# Patient Record
Sex: FEMALE | Race: BLACK OR AFRICAN AMERICAN
[De-identification: names, ages, dates, MRNs, and addresses within clinical notes are randomized per-mention and may not be internally consistent; named-entity substitution may affect disease eponyms.]

---

## 2017-01-01 ENCOUNTER — HOSPITAL ENCOUNTER (EMERGENCY)
Dept: HOSPITAL 74 - JER | Age: 37
Discharge: HOME | End: 2017-01-01
Payer: COMMERCIAL

## 2017-01-01 VITALS — BODY MASS INDEX: 21.6 KG/M2

## 2017-01-01 VITALS — DIASTOLIC BLOOD PRESSURE: 68 MMHG | SYSTOLIC BLOOD PRESSURE: 136 MMHG

## 2017-01-01 VITALS — TEMPERATURE: 98 F

## 2017-01-01 VITALS — HEART RATE: 70 BPM

## 2017-01-01 DIAGNOSIS — R07.89: ICD-10-CM

## 2017-01-01 DIAGNOSIS — K21.9: Primary | ICD-10-CM

## 2017-01-01 PROCEDURE — 3E0333Z INTRODUCTION OF ANTI-INFLAMMATORY INTO PERIPHERAL VEIN, PERCUTANEOUS APPROACH: ICD-10-PCS

## 2017-01-01 NOTE — PDOC
History of Present Illness





- General


Chief Complaint: Chest Pain


Stated Complaint: CHEST PAIN


Time Seen by Provider: 01/01/17 08:34


History Source: Patient


Exam Limitations: No Limitations





- History of Present Illness


Initial Comments: 


CHIEF COMPLAINT:  35 y/o afebrile female with no significant PMH c/o chest pain 

this morning. 





HISTORY OF PRESENT ILLNESS:  The patient states while going to the bathroom 

this morning she began feeling sharp pain in her chest and points to her 

epigastric region.  She states the pain travels up her sternum.  She denies 

dizziness, HA, f/c, n/v/d, SOB, cough, abd pain, back pain, hematuria, dysuria. 





Vital signs on arrival are within normal limits. 





REVIEW OF SYSTEMS:


GENERAL/CONSTITUTIONAL: No fever/chills. No weakness. No weight change.


HEAD, EYES, EARS, NOSE AND THROAT: No change in vision. No ear pain or 

discharge. No sore throat.


CARDIOVASCULAR: +chest pain.  No shortness of breath.


RESPIRATORY: No cough, wheezing, or hemoptysis.


GASTROINTESTINAL: No abd pain, nausea, vomiting, diarrhea. 


GENITOURINARY: No dysuria, frequency, or change in urination.


MUSCULOSKELETAL: No joint or muscle swelling or pain. No neck or back pain.


SKIN: No rash or easy bruising.


NEUROLOGIC: No headache, vertigo, loss of consciousness, or loss of sensation.


PSYCHIATRIC: No depression or anxiety.


ENDOCRINE: No increased thirst. No abnormal weight change.


HEMATOLOGIC/LYMPHATIC: No anemia, easy bleeding, or history of blood clots.


ALLERGIC/IMMUNOLOGIC: No hives or skin allergy. No latex allergy.





PHYSICAL EXAM:


GENERAL: The patient is awake, alert, and fully oriented, in no acute distress.

  She is sleeping in the ER bed. 


HEAD: Normal with no signs of trauma.


ENT: Pupils equal, round and reactive to light, extraocular movements intact, 

sclera anicteric, conjunctiva clear. Neck supple.


LUNGS: Clear to auscultation bilaterally. Normal excursion. No respiratory 

distress or use of accessory muscles.


CHEST: Reproducible pain with palpation of anterior sternum and epigastric 

region. 


CV: RRR, S1/S2, no MRG. Cap refill < 2 sec.


ABDOMEN: Soft, non-distended, non-tender even to deep palpation, no 

hepatomegaly or splenomegaly, no masses.


EXTREMITIES: Normal range of motion, no edema.


NEUROLOGICAL: Normal speech, normal gait. CN II-XII grossly intact.


PSYCH: Normal mood, normal affect.


SKIN: Warm, dry, normal turgor, no rashes or lesions noted.











Past History





- Past Medical History


Allergies/Adverse Reactions: 


 Allergies











Allergy/AdvReac Type Severity Reaction Status Date / Time


 


No Known Allergies Allergy   Verified 01/01/17 08:10











Home Medications: 


Ambulatory Orders





Prenatal Vit Calc,Iron,Folic [Prenatal Vitamins] 1 each PO DAILY 01/01/17 








Anemia: No


Asthma: No


Cancer: No


Cardiac Disorders: No


CVA: No


COPD: No


CHF: No


Dementia: No


Diabetes: No


GI Disorders: No


 Disorders: No


HTN: No


Hypercholesterolemia: No


Kidney Stones: No


Liver Disease: No


Suicide Attempt (Hx): No


Seizures: No


Thyroid Disease: No


Other medical history: DENIES.





- Reproductive History


PID: No





- Psycho/Social/Smoking Cessation Hx


Anxiety: No


Suicidal Ideation: No


Smoking History: Current every day smoker


Have you smoked in the past 12 months: Yes


Number of Cigarettes Smoked Daily: 20


Cigars Per Day: 0


Information on smoking cessation initiated: No


'Breaking Loose' booklet given: 11/09/16


Hx Alcohol Use: No


Drug/Substance Use Hx: No


Substance Use Type: None


Hx Substance Use Treatment: Yes ([facility])





Cardiac Specific PMH





- Complaint Specific PMHX


Pacemaker: No





*Physical Exam





- Vital Signs


 Last Vital Signs











Temp Pulse Resp BP Pulse Ox


 


 98 F   70   18   145/77   99 


 


 01/01/17 08:07  01/01/17 14:32  01/01/17 14:32  01/01/17 14:32  01/01/17 14:32














**Heart Score/ECG Review





- ECG Intrepretation


Comment:: 


Twelve-lead EKG was performed and reviewed by Dr. Pride. There is normal 

sinus rhythm with a normal rate. The axis is normal. The intervals are normal. 

There are no ST or T wave abnormalities.





Impression: Normal twelve-lead EKG








ED Treatment Course





- ADDITIONAL ORDERS


Additional order review: 


 Laboratory  Results











  01/01/17





  14:36


 


Urine HCG, Qual  Negative














- Medications


Given in the ED: 


ED Medications














Discontinued Medications














Generic Name Dose Route Start Last Admin





  Trade Name Freq  PRN Reason Stop Dose Admin


 


Ketorolac Tromethamine  60 mg 01/01/17 15:03 01/01/17 15:28





  Toradol Injection -  IVPUSH 01/01/17 15:04  Not Given





  ONCE ONE   


 


Ketorolac Tromethamine  30 mg 01/01/17 15:09 01/01/17 15:15





  Toradol Injection -  IVPUSH 01/01/17 15:10  30 mg





  ONCE ONE   Administration


 


Ranitidine HCl  300 mg 01/01/17 10:47 01/01/17 11:27





  Zantac -  PO 01/01/17 10:48  300 mg





  ONCE ONE   Administration














Medical Decision Making





- Medical Decision Making


A/P: 35 y/o female with chest pain that appears to be GI related.  Plan is as 

follows:





1. EKG


2. hcg


3. GI cocktail





EKG normal


Hcg - negative


IV toradol ordered





The patient states she feels much better and all of her pain has gone.  Suspect 

musculoskeletal chest pain and GERD.  Will discharge to home with supportive 

care instructions. 





Suggested she return to the ER with any worsening or concerning symptoms. 





The patient verbalizes understanding of all instructions, has no further 

questions and is awaiting discharge.








*DC/Admit/Observation/Transfer


Diagnosis at time of Disposition: 


 Musculoskeletal chest pain, Gastroesophageal reflux disease





- Discharge Dispostion


Disposition: HOME


Condition at time of disposition: Improved





- Patient Instructions


Printed Discharge Instructions:  DI for Musculoskeletal Pain, DI for Atypical 

Chest Pain, GERD Diet, DI for Gastroesophageal Reflux Disease (GERD)


Additional Instructions: 


Discharge Instructions:


-Take tylenol for your chest pain


-Take zantac if needed for upper abd pain 


-Follow up with your doctor within 1 week


-Return to the ER immediately with any worsening or concerning symptoms

## 2017-01-02 NOTE — EKG
Test Reason : 

Blood Pressure : ***/*** mmHG

Vent. Rate : 071 BPM     Atrial Rate : 071 BPM

   P-R Int : 148 ms          QRS Dur : 102 ms

    QT Int : 416 ms       P-R-T Axes : 050 044 038 degrees

   QTc Int : 452 ms

 

NORMAL SINUS RHYTHM WITH SINUS ARRHYTHMIA

POSSIBLE LEFT ATRIAL ENLARGEMENT

LEFT VENTRICULAR HYPERTROPHY

ABNORMAL ECG

NO PREVIOUS ECGS AVAILABLE

Confirmed by AGUSTO KILGORE MD ([id number]) on 1/2/2017 1:07:18 PM

 

Referred By:             Overread By: AGUSTO KILGORE MD

## 2017-09-28 ENCOUNTER — HOSPITAL ENCOUNTER (EMERGENCY)
Dept: HOSPITAL 74 - JER | Age: 37
Discharge: HOME | End: 2017-09-28
Payer: COMMERCIAL

## 2017-09-28 VITALS — SYSTOLIC BLOOD PRESSURE: 121 MMHG | HEART RATE: 72 BPM | DIASTOLIC BLOOD PRESSURE: 68 MMHG

## 2017-09-28 VITALS — TEMPERATURE: 98.6 F

## 2017-09-28 VITALS — BODY MASS INDEX: 21.6 KG/M2
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
## 2017-09-28 DIAGNOSIS — L02.415: Primary | ICD-10-CM
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
## 2017-09-28 PROCEDURE — 0H9KXZZ DRAINAGE OF RIGHT LOWER LEG SKIN, EXTERNAL APPROACH: ICD-10-PCS | Performed by: EMERGENCY MEDICINE

## 2017-09-28 NOTE — PDOC
History of Present Illness





- General


Chief Complaint: Wound Infection


Stated Complaint: SPIDER BITE


Time Seen by Provider: 09/28/17 15:02





Past History





- Past Medical History


Allergies/Adverse Reactions: 


 Allergies











Allergy/AdvReac Type Severity Reaction Status Date / Time


 


No Known Allergies Allergy   Verified 09/28/17 14:42











Home Medications: 


Ambulatory Orders





Prenatal Vit Calc,Iron,Folic [Prenatal Vitamins] 1 each PO DAILY 01/01/17 


Ondansetron [Zofran *Odt*] 8 mg SL TID PRN #21 od.tablet 05/12/17 








Anemia: No


Asthma: No


Cancer: No


Cardiac Disorders: No


CVA: No


COPD: No


CHF: No


Dementia: No


Diabetes: No


GI Disorders: No


 Disorders: No


HTN: No


Hypercholesterolemia: No


Kidney Stones: No


Liver Disease: No


Seizures: No


Thyroid Disease: No





- Reproductive History


PID: No





- Suicide/Smoking/Psychosocial Hx


Smoking History: Current every day smoker


Have you smoked in the past 12 months: Yes


Number of Cigarettes Smoked Daily: 20


Cigars Per Day: 0


Information on smoking cessation initiated: No


'Breaking Loose' booklet given: 11/09/16


Hx Alcohol Use: No


Drug/Substance Use Hx: No


Substance Use Type: None


Hx Substance Use Treatment: Yes ([facility])





*Physical Exam





- Vital Signs


 Last Vital Signs











Temp Pulse Resp BP Pulse Ox


 


 98.6 F   89   20   132/78   99 


 


 09/28/17 14:43  09/28/17 14:43  09/28/17 14:43  09/28/17 14:43  09/28/17 14:43

## 2017-09-28 NOTE — PDOC
History of Present Illness





- General


Chief Complaint: Wound Infection


Stated Complaint: SPIDER BITE


Time Seen by Provider: 17 15:02





- History of Present Illness


Initial Comments: 


17 15:02


Ms. Rodriguez is a 37 yo female with no significant past medical history who 

presents to the emergency department complaining of a "spider bite" in her 

upper right thigh. She says that she first noticed a small bite in this 

location on Tuesday that progressed to about 3 inches in diameter. Yesterday 

she attempted to express fluid and was able to get minimal white pus out of it. 

She complains of subjective warmth / fevers.





The patient denies chest pain, shortness of breath, headache and dizziness. 

Denies chills, nausea, vomit, diarrhea and constipation. Denies dysuria, 

frequency, urgency and hematuria.





Allergies: NKDA


Past surgical history:Denies








Past History





- Past Medical History


Allergies/Adverse Reactions: 


 Allergies











Allergy/AdvReac Type Severity Reaction Status Date / Time


 


No Known Allergies Allergy   Verified 17 14:42











Home Medications: 


Ambulatory Orders





Sulfamethoxazole/Trimethoprim [Bactrim Ds -] 1 tab PO BID #20 tablet 17 








Anemia: No


Asthma: No


Cancer: No


Cardiac Disorders: No


CVA: No


COPD: No


CHF: No


Dementia: No


Diabetes: No


GI Disorders: No


 Disorders: No


HTN: No


Hypercholesterolemia: No


Kidney Stones: No


Liver Disease: No


Seizures: No


Thyroid Disease: No





- Reproductive History


PID: No





- Suicide/Smoking/Psychosocial Hx


Smoking History: Current every day smoker


Have you smoked in the past 12 months: Yes


Number of Cigarettes Smoked Daily: 20


Cigars Per Day: 0


Information on smoking cessation initiated: No


'Breaking Loose' booklet given: 16


Hx Alcohol Use: No


Drug/Substance Use Hx: No


Substance Use Type: None


Hx Substance Use Treatment: Yes ([facility])





**Review of Systems





- Review of Systems


Comments:: 


17 15:02


GENERAL/CONSTITUTIONAL: No fever or chills. No weakness.


HEAD, EYES, EARS, NOSE AND THROAT: No change in vision. No ear pain or 

discharge. No sore throat.


CARDIOVASCULAR: No chest pain or shortness of breath


RESPIRATORY: No cough, wheezing, or hemoptysis.


GASTROINTESTINAL: No nausea, vomiting, diarrhea or constipation.


GENITOURINARY: No dysuria, frequency, or change in urination.


MUSCULOSKELETAL: +Painful Right leg infection. No joint or muscle swelling. No 

neck or back pain.


SKIN: No rash


NEUROLOGIC: No headache, vertigo, loss of consciousness, or change in strength/

sensation.


ENDOCRINE: No increased thirst. No abnormal weight change


HEMATOLOGIC/LYMPHATIC: No anemia, easy bleeding, or history of blood clots.


ALLERGIC/IMMUNOLOGIC: No hives or skin allergy.





*Physical Exam





- Vital Signs


 Last Vital Signs











Temp Pulse Resp BP Pulse Ox


 


 98.6 F   89   20   132/78   99 


 


 17 14:43  17 14:43  17 14:43  17 14:43  17 14:43














- Physical Exam


Comments: 


17 15:02


GENERAL: Awake, alert, and fully oriented, in no acute distress


HEAD: No signs of trauma, normocephalic, atraumatic


EYES: PERRLA, EOMI, sclera anicteric, conjunctiva clear


ENT: Auricles normal inspection, hearing grossly normal, nares patent, 

oropharynx clear without


exudates. Moist mucosa


NECK: Normal ROM, supple, no lymphadenopathy, JVD, or masses


LUNGS: No distress, speaks full sentences, clear to auscultation bilaterally


HEART: Regular rate and rhythm, normal S1 and S2, no murmurs, rubs or gallops, 

peripheral pulses normal and equal bilaterally.


ABDOMEN: Soft, nontender, normoactive bowel sounds.  No guarding, no rebound.  

No masses


EXTREMITIES: +Right upper thigh fluctuant abscess measuring approximately 3 

inches in diameter on the lateral portion of the RLE. Warm to touch. Normal 

inspection, Normal range of motion, no edema.  No clubbing or cyanosis.


NEUROLOGICAL: Cranial nerves II through XII grossly intact.  Normal speech, 

normal gait, no focal sensorimotor deficits


SKIN: Warm, Dry, normal turgor, no rashes or lesions noted.








Procedures





- Incision and Drainage


I&D Site: Right: Leg


Anesthesia: 1% Lidocaine


Volume(ml): 4


Iodinated Packin/ in





Medical Decision Making





- Medical Decision Making





17 15:47


Ms. Rodriguez presents for evaluation of her leg abscess. 





*DC/Admit/Observation/Transfer


Diagnosis at time of Disposition: 


 wound





- Discharge Dispostion


Disposition: HOME





- Prescriptions


Prescriptions: 


Sulfamethoxazole/Trimethoprim [Bactrim Ds -] 1 tab PO BID #20 tablet





- Referrals


Referrals: 


STAFF,NOT ON [Primary Care Provider] -

## 2017-09-30 NOTE — PDOC
Attending Attestation





- Resident


Resident Name: Santosh James





- HPI


HPI: 





09/30/17 14:05


Pt presents to the ED complaining of abscess to the R flank.  Denies systemic 

complaints. 





- Physicial Exam


PE: 





09/30/17 14:06


Agree with above exam.  3 cm abscess to left flank





- Medical Decision Making





09/30/17 14:07


pt presented to the ED with abscess without signs of systemic infection.  I and 

D performed in the Ed.  Will discharge home.

## 2019-07-01 ENCOUNTER — HOSPITAL ENCOUNTER (INPATIENT)
Dept: HOSPITAL 74 - YASAS | Age: 39
LOS: 5 days | Discharge: HOME | DRG: 773 | End: 2019-07-06
Attending: SURGERY | Admitting: SURGERY
Payer: COMMERCIAL

## 2019-07-01 VITALS — BODY MASS INDEX: 22.9 KG/M2

## 2019-07-01 DIAGNOSIS — Z85.43: ICD-10-CM

## 2019-07-01 DIAGNOSIS — E87.6: ICD-10-CM

## 2019-07-01 DIAGNOSIS — F14.10: ICD-10-CM

## 2019-07-01 DIAGNOSIS — K21.9: ICD-10-CM

## 2019-07-01 DIAGNOSIS — F17.210: ICD-10-CM

## 2019-07-01 DIAGNOSIS — D64.9: ICD-10-CM

## 2019-07-01 DIAGNOSIS — F11.23: Primary | ICD-10-CM

## 2019-07-01 DIAGNOSIS — F13.10: ICD-10-CM

## 2019-07-01 DIAGNOSIS — F12.20: ICD-10-CM

## 2019-07-01 DIAGNOSIS — L30.9: ICD-10-CM

## 2019-07-01 DIAGNOSIS — F32.9: ICD-10-CM

## 2019-07-01 LAB
ALBUMIN SERPL-MCNC: 3.9 G/DL (ref 3.4–5)
ALP SERPL-CCNC: 67 U/L (ref 45–117)
ALT SERPL-CCNC: 18 U/L (ref 13–61)
ANION GAP SERPL CALC-SCNC: 4 MMOL/L (ref 8–16)
AST SERPL-CCNC: 8 U/L (ref 15–37)
BILIRUB SERPL-MCNC: 0.5 MG/DL (ref 0.2–1)
BUN SERPL-MCNC: 10.2 MG/DL (ref 7–18)
CALCIUM SERPL-MCNC: 8.8 MG/DL (ref 8.5–10.1)
CHLORIDE SERPL-SCNC: 105 MMOL/L (ref 98–107)
CO2 SERPL-SCNC: 28 MMOL/L (ref 21–32)
CREAT SERPL-MCNC: 0.8 MG/DL (ref 0.55–1.3)
DEPRECATED RDW RBC AUTO: 17 % (ref 11.6–15.6)
GLUCOSE SERPL-MCNC: 109 MG/DL (ref 74–106)
HCT VFR BLD CALC: 30.7 % (ref 32.4–45.2)
HGB BLD-MCNC: 9.9 GM/DL (ref 10.7–15.3)
MCH RBC QN AUTO: 25.3 PG (ref 25.7–33.7)
MCHC RBC AUTO-ENTMCNC: 32.4 G/DL (ref 32–36)
MCV RBC: 78.3 FL (ref 80–96)
PLATELET # BLD AUTO: 219 K/MM3 (ref 134–434)
PMV BLD: 7.3 FL (ref 7.5–11.1)
POTASSIUM SERPLBLD-SCNC: 3.4 MMOL/L (ref 3.5–5.1)
PROT SERPL-MCNC: 7.5 G/DL (ref 6.4–8.2)
RBC # BLD AUTO: 3.92 M/MM3 (ref 3.6–5.2)
SODIUM SERPL-SCNC: 137 MMOL/L (ref 136–145)
WBC # BLD AUTO: 3.8 K/MM3 (ref 4–10)

## 2019-07-01 PROCEDURE — HZ2ZZZZ DETOXIFICATION SERVICES FOR SUBSTANCE ABUSE TREATMENT: ICD-10-PCS | Performed by: SURGERY

## 2019-07-01 RX ADMIN — Medication PRN MG: at 22:28

## 2019-07-01 RX ADMIN — HYDROXYZINE PAMOATE PRN MG: 25 CAPSULE ORAL at 22:28

## 2019-07-01 RX ADMIN — METHOCARBAMOL PRN MG: 500 TABLET ORAL at 22:28

## 2019-07-01 RX ADMIN — Medication SCH MG: at 22:28

## 2019-07-01 NOTE — HP
COWS





- Scale


Resting Pulse: 0= HI 80 or Below


Sweatin= No chills or Flushing


Restless Observation: 1= Difficult to Sit Still


Pupil Size: 0= Normal to Room Light


Bone or Joint Aches: 1= Mild Discomfort


Runny Nose/ Eye Tearin= None


GI Upset > 30mins: 1= Stomach Cramp


Tremor Observation: 2= Slight Tremor Visible


Yawning Observation: 1= 1-2x During Session


Anxiety or Irritability: 1=Feels Anxious/Irritable


Goose Flesh Skin: 3=Piloerection


COWS Score: 10





CIWA Score





- Admission Criteria


[facility] Guidelines: Admission for Medically Managed Detox: 


Requires at least one of the followin. CIWA greater than 12


2. Seizures within the past 24 hours


3. Delirium tremens within the past 24 hours


4. Hallucinations within the past 24 hours


5. Acute intervention needed for co  occurring medical disorder


6. Acute intervention needed for co  occurring psychiatric disorder


7. Severe withdrawal that cannot be handled at a lower level of care (continued


    vomiting, continued diarrhea, abnormal vital signs) requiring intravenous


    medication and/or fluids


8. Pregnancy








Admission ROS [facility]


Chief Complaint: 





39 y/o with hx ovarian CA, eczema, who presents for detox from heroin. Last use 

of heroin was this AM. Uses 20 bags/time. Spends 200-300 dollars at a time for 

it. Used intranasally. Doesn't use intravenously. Longest sobriety five years. 

Has never overdosed; no suicidal or homicide attempts previously. Uses two bags 

of marijuana a day.





PMH: ovarin CA, eczema


PsxH: none


meds: none


allergies: NKDA


FH: none


SH: lives in a private house in [place].


smokes 1 ppd cigarettes x 10 yrs.


denies alcohol use.


uses heroin as above





Allergies/Adverse Reactions: 


 Allergies











Allergy/AdvReac Type Severity Reaction Status Date / Time


 


No Known Allergies Allergy   Verified 19 12:59











History of Present Illness: 


39 y/o F with hx ovarian CA ( 1 round of chemotherapy in past; d/c due to side 

effect) and eczema who presents for heroin detox. Was in detox last in 2017 and 

rehab in 2017.


Exam Limitations: No Limitations





- Ebola screening


Have you traveled outside of the country in the last 21 days: No


Have you had contact with anyone from an Ebola affected area: No


Do you have a fever: No





- Review of Systems


Constitutional: Loss of Appetite


EENT: reports: No Symptoms Reported


Respiratory: reports: No Symptoms reported


Cardiac: reports: No Symptoms Reported


GI: reports: No Symptoms Reported


: reports: No Symptoms Reported


Musculoskeletal: reports: No Symptoms Reported


Integumentary: reports: Dryness


Neuro: reports: No Symptoms reported


Endocrine: reports: No Symptoms Reported


Hematology: reports: No Symptoms Reported


Psychiatric: reports: Agitated, Anxious





Patient History





- Patient Medical History


Hx Anemia: No


Hx Asthma: No


Hx Chronic Obstructive Pulmonary Disease (COPD): No


Hx Cancer: No


Hx Cardiac Disorders: No


Hx Congestive Heart Failure: No


Hx Hypertension: No


Hx Hypercholesterolemia: No


Hx Pacemaker: No


HX Cerebrovascular Accident: No


Hx Seizures: No


Hx Dementia: No


Hx Diabetes: No


Hx Gastrointestinal Disorders: No


Hx Liver Disease: No


Hx Genitourinary Disorders: No


Hx Sexually Transmitted Disorders: No


Hx Renal Disease (ESRD): No


Hx Thyroid Disease: No


Hx Human Immunodeficiency Virus (HIV): No


Hx Hepatitis C: No


Hx Depression: Yes (DENIES/ SI/HI)


Hx Suicide Attempt: No


Hx Bipolar Disorder: No


Hx Schizophrenia: No


Other Medical History: ovarian cancer, eczema





- Patient Surgical History


Past Surgical History: No





- PPD History


Documented Results: Negative w/o proof


Date: 16


Results: 0mm


PPD to be Administered?: No





- Reproductive History


Patient is a Female of Child Bearing Age (11 -55 yrs old): Yes


Last Menstrual Period: 20


Patient Pregnant: No





- Smoking Cessation


Smoking history: Current every day smoker


Have you smoked in the past 12 months: Yes


Aproximately how many cigarettes per day: 20


Cigars Per Day: 0


Hx Chewing Tobacco Use: No


Initiated information on smoking cessation: Yes


'Breaking Loose' booklet given: 19





- Substance & Tx. History


Hx Alcohol Use: No


Hx Substance Use: Yes


Substance Use Type: Heroin, Marijuana


Hx Substance Use Treatment: Yes (at [facility] for detox and rehab 2017 )





- Substances abused


  ** Heroin


Substance route: Inhalation


Frequency: Daily


Amount used: 40 bags


Age of first use: 20


Date of last use: 19





  ** Marijuana/Hashish


Substance route: Smoking


Frequency: Daily


Amount used: 2 bags


Age of first use: 20


Date of last use: 20





Family Disease History





- Family Disease History


Family History: Denies


Family Disease History: Other: Father ( AIDS), Mother (HIV)





Admission Physical Exam [facility]





- Vital Signs


Vital Signs: 


 Vital Signs - 24 hr











  19





  13:00


 


Temperature 97 F L


 


Pulse Rate 79


 


Respiratory 20





Rate 


 


Blood Pressure 127/85














- Physical


General Appearance: Yes: Within Normal Limits, Thin, Irritable, Anxious


HEENTM: Yes: Normocephalic


Respiratory: Yes: Within Normal Limits, Other (+poor inspiratory effort)


Neck: Yes: Supple


Breast: Yes: Breast Exam Deferred


Cardiology: Yes: Tachycardia


Abdominal: Yes: Within Normal Limits


Genitourinary: Yes: Within Normal Limits


Back: Yes: Muscle Spasm


Musculoskeletal: Yes: full range of Motion


Extremities: Yes: Other (+L anterior portion of ankle with minor swelling, TTP)


Neurological: Yes: Within Normal Limits


Integumentary: Yes: Dry, Warm





- Diagnostic


(1) Heroin withdrawal


Current Visit: Yes   Status: Acute   





(2) Marijuana dependence


Current Visit: Yes   Status: Acute   





(3) Ovarian cancer


Current Visit: Yes   Status: Acute   





Cleared for Admission [facility]





- Detox or Rehab


[facility] Level of Care: Medically Managed


Detox Regimen/Protocol: Methadone





Breathalyzer





- Breathalyzer


Breathalyzer: 0





Urine Drug Screen





- Test Device


Lot number: [id number]


Expiration date: 21





- Control


Is test valid?: Yes





- Results


Drug screen NEGATIVE: No


Urine drug screen results: THC-Marijuana, DARLYN-Cocaine, FEN-Fentanyl, MOP-Opiates

, BZO-Benzodiazepines





Inpatient Rehab Admission





- Rehab Decision to Admit


Inpatient rehab admission?: No

## 2019-07-02 RX ADMIN — Medication SCH MG: at 22:14

## 2019-07-02 RX ADMIN — HYDROXYZINE PAMOATE PRN MG: 25 CAPSULE ORAL at 22:15

## 2019-07-02 RX ADMIN — Medication SCH TAB: at 11:29

## 2019-07-02 RX ADMIN — METHOCARBAMOL PRN MG: 500 TABLET ORAL at 22:14

## 2019-07-02 RX ADMIN — Medication PRN MG: at 22:15

## 2019-07-02 RX ADMIN — POTASSIUM CHLORIDE SCH MEQ: 1500 TABLET, EXTENDED RELEASE ORAL at 15:14

## 2019-07-02 NOTE — EKG
Test Reason : 

Blood Pressure : ***/*** mmHG

Vent. Rate : 069 BPM     Atrial Rate : 069 BPM

   P-R Int : 182 ms          QRS Dur : 104 ms

    QT Int : 406 ms       P-R-T Axes : 051 045 027 degrees

   QTc Int : 435 ms

 

NORMAL SINUS RHYTHM

POSSIBLE LEFT ATRIAL ENLARGEMENT

LEFT VENTRICULAR HYPERTROPHY

ABNORMAL ECG

WHEN COMPARED WITH ECG OF 01-JAN-2017 08:10,

NO SIGNIFICANT CHANGE WAS FOUND

Confirmed by MD JOSIE, DAVID ([id number]) on 7/2/2019 1:47:17 PM

 

Referred By: LARISA CARLISLE           Confirmed By:DAVID GALINDO MD

## 2019-07-02 NOTE — PN
[facility] Progress Note


Note: 





i personally present,review,plan for treatment,history and physical examination 

and concurred and agreed with 


Dr.Abbi Mukherjee,patient need inpatient detox,medical manage and opiate regimen

## 2019-07-02 NOTE — PN
[facility] COWS





- Scale


Resting Pulse: 0= VA 80 or Below


Sweatin=Flushed/Facial Moisture


Restless Observation: 0= Sits Still


Pupil Size: 0= Normal to Room Light


Bone or Joint Aches: 1= Mild Discomfort


Runny Nose/ Eye Tearin= Nasal Congestion


GI Upset > 30mins: 0= None


Tremor Observation of Outstretched Hands: 1= Tremor Felt, Not Seen


Yawning Observation: 1= 1-2x During Session


Anxiety or Irritability: 1=Feels Anxious/Irritable


Goose Flesh Skin: 0=Smooth Skin


COWS Score: 7





[facility] Progress Note (SOAP)


Subjective: 





sweats


shakes


interrupted sleep


body aches


Objective: 





19 14:32


 Vital Signs











Temperature  97.5 F L  19 13:28


 


Pulse Rate  71   19 13:28


 


Respiratory Rate  18   19 13:28


 


Blood Pressure  135/87   19 13:28


 


O2 Sat by Pulse Oximetry (%)      








 Laboratory Tests











  19





  15:30 15:30 15:30


 


WBC  3.8 L  


 


RBC  3.92  


 


Hgb  9.9 L  


 


Hct  30.7 L  


 


MCV  78.3 L  


 


MCH  25.3 L  


 


MCHC  32.4  


 


RDW  17.0 H  


 


Plt Count  219  D  


 


MPV  7.3 L  


 


Sodium   137 


 


Potassium   3.4 L 


 


Chloride   105 


 


Carbon Dioxide   28 


 


Anion Gap   4 L 


 


BUN   10.2 


 


Creatinine   0.8 


 


Est GFR (CKD-EPI)AfAm   109.16 


 


Est GFR (CKD-EPI)NonAf   94.18 


 


Random Glucose   109 H 


 


Calcium   8.8 


 


Total Bilirubin   0.5 


 


AST   8 L 


 


ALT   18 


 


Alkaline Phosphatase   67 


 


Total Protein   7.5 


 


Albumin   3.9 


 


RPR Titer    Nonreactive








labs noted


potassium minimal low 3.4; 20meq x 2 days 


aaox3


lying in bed


no acute distress 


Assessment: 





19 14:33


withdrawal sx


Plan: 





continue detox


increase fluids

## 2019-07-03 LAB
APPEARANCE UR: (no result)
BACTERIA # UR AUTO: 215.3 /HPF
BILIRUB UR STRIP.AUTO-MCNC: NEGATIVE MG/DL
CASTS URNS QL MICRO: 23 /LPF (ref 0–8)
COLOR UR: (no result)
CRYSTALS URNS QL MICRO: (no result) /HPF
EPITH CASTS URNS QL MICRO: 30.5 /HPF
KETONES UR QL STRIP: NEGATIVE
LEUKOCYTE ESTERASE UR QL STRIP.AUTO: (no result)
NITRITE UR QL STRIP: NEGATIVE
PH UR: 5 [PH] (ref 5–8)
PROT UR QL STRIP: NEGATIVE
PROT UR QL STRIP: NEGATIVE
RBC # BLD AUTO: 5 /HPF (ref 0–4)
SP GR UR: 1.03 (ref 1.01–1.03)
UROBILINOGEN UR STRIP-MCNC: 0.2 MG/DL (ref 0.2–1)
WBC # UR AUTO: 16 /HPF (ref 0–5)

## 2019-07-03 RX ADMIN — Medication SCH MG: at 22:17

## 2019-07-03 RX ADMIN — POTASSIUM CHLORIDE SCH MEQ: 1500 TABLET, EXTENDED RELEASE ORAL at 10:34

## 2019-07-03 RX ADMIN — Medication SCH TAB: at 10:34

## 2019-07-03 RX ADMIN — Medication PRN MG: at 22:17

## 2019-07-03 NOTE — PN
[facility] COWS





- Scale


Resting Pulse: 0= [place] 80 or Below


Sweatin= Chills/Flushing


Restless Observation: 0= Sits Still


Pupil Size: 0= Normal to Room Light


Bone or Joint Aches: 1= Mild Discomfort


Runny Nose/ Eye Tearin= Nasal Congestion


GI Upset > 30mins: 0= None


Tremor Observation of Outstretched Hands: 1= Tremor Felt, Not Seen


Yawning Observation: 0= None


Anxiety or Irritability: 2=Irritable/Anxious


Goose Flesh Skin: 0=Smooth Skin (1)


COWS Score: 6





BHS Progress Note (SOAP)


Subjective: 





ANXIETY, SWEATS/CHILLS,INTERMITTENT SLEEP.


Objective: 





19 15:12


 Vital Signs - 24 hr











  19





  17:32 21:34 03:30


 


Temperature 98.4 F 98.2 F 


 


Pulse Rate 76 69 


 


Respiratory 18 18 16





Rate   


 


Blood Pressure 145/76 137/80 














  19





  08:27 09:46 13:25


 


Temperature 99.1 F 97.8 F 96.5 F L


 


Pulse Rate 71 66 80


 


Respiratory 16 18 18





Rate   


 


Blood Pressure 138/79 155/78 146/94








 Laboratory Tests











  19





  15:30 15:30 15:30


 


WBC  3.8 L  


 


RBC  3.92  


 


Hgb  9.9 L  


 


Hct  30.7 L  


 


MCV  78.3 L  


 


MCH  25.3 L  


 


MCHC  32.4  


 


RDW  17.0 H  


 


Plt Count  219  D  


 


MPV  7.3 L  


 


Sodium   137 


 


Potassium   3.4 L 


 


Chloride   105 


 


Carbon Dioxide   28 


 


Anion Gap   4 L 


 


BUN   10.2 


 


Creatinine   0.8 


 


Est GFR (CKD-EPI)AfAm   109.16 


 


Est GFR (CKD-EPI)NonAf   94.18 


 


Random Glucose   109 H 


 


Calcium   8.8 


 


Total Bilirubin   0.5 


 


AST   8 L 


 


ALT   18 


 


Alkaline Phosphatase   67 


 


Total Protein   7.5 


 


Albumin   3.9 


 


Urine Color   


 


Urine Appearance   


 


Urine pH   


 


Ur Specific Gravity   


 


Urine Protein   


 


Urine Glucose (UA)   


 


Urine Ketones   


 


Urine Blood   


 


Urine Nitrite   


 


Urine Bilirubin   


 


Urine Urobilinogen   


 


Ur Leukocyte Esterase   


 


Urine WBC (Auto)   


 


Urine RBC (Auto)   


 


Urine Casts (Auto)   


 


U Pathogenic Cast Auto   


 


U Epithel Cells (Auto)   


 


Urine Crystals (Auto)   


 


Urine Bacteria (Auto)   


 


RPR Titer    Nonreactive














  19





  08:50


 


WBC 


 


RBC 


 


Hgb 


 


Hct 


 


MCV 


 


MCH 


 


MCHC 


 


RDW 


 


Plt Count 


 


MPV 


 


Sodium 


 


Potassium 


 


Chloride 


 


Carbon Dioxide 


 


Anion Gap 


 


BUN 


 


Creatinine 


 


Est GFR (CKD-EPI)AfAm 


 


Est GFR (CKD-EPI)NonAf 


 


Random Glucose 


 


Calcium 


 


Total Bilirubin 


 


AST 


 


ALT 


 


Alkaline Phosphatase 


 


Total Protein 


 


Albumin 


 


Urine Color  Dk yellow


 


Urine Appearance  Turbid


 


Urine pH  5.0  D


 


Ur Specific Gravity  1.031


 


Urine Protein  Negative


 


Urine Glucose (UA)  Negative


 


Urine Ketones  Negative


 


Urine Blood  Negative


 


Urine Nitrite  Negative


 


Urine Bilirubin  Negative


 


Urine Urobilinogen  0.2


 


Ur Leukocyte Esterase  Trace


 


Urine WBC (Auto)  16


 


Urine RBC (Auto)  5


 


Urine Casts (Auto)  23


 


U Pathogenic Cast Auto  None seen


 


U Epithel Cells (Auto)  30.5


 


Urine Crystals (Auto)  Amoprphous urates


 


Urine Bacteria (Auto)  215.3


 


RPR Titer 








K+ =3.4


Assessment: 





19 15:13


WITHDRAWALS SX


BORDERLINE HYPOKALEMIA


Plan: 





CONTINUE DETOX


ON [facility]

## 2019-07-04 RX ADMIN — Medication SCH TAB: at 10:34

## 2019-07-04 RX ADMIN — POTASSIUM CHLORIDE SCH MEQ: 1500 TABLET, EXTENDED RELEASE ORAL at 10:34

## 2019-07-04 RX ADMIN — Medication PRN MG: at 22:37

## 2019-07-04 RX ADMIN — Medication SCH MG: at 22:37

## 2019-07-04 RX ADMIN — METHOCARBAMOL PRN MG: 500 TABLET ORAL at 22:37

## 2019-07-04 NOTE — PN
[facility] COWS





- Scale


Resting Pulse: 0= [place] 80 or Below


Sweatin= Chills/Flushing


Restless Observation: 0= Sits Still


Pupil Size: 0= Normal to Room Light


Bone or Joint Aches: 0= None


Runny Nose/ Eye Tearin= None


GI Upset > 30mins: 0= None


Tremor Observation of Outstretched Hands: 0= None


Yawning Observation: 2= >3x During Session


Anxiety or Irritability: 2=Irritable/Anxious


Goose Flesh Skin: 0=Smooth Skin


COWS Score: 5





BHS Progress Note (SOAP)


Subjective: 





c/o chills and irritability.


Objective: 





19 11:06


 Vital Signs











  19





  03:30 06:00 09:36


 


Temperature  98.2 F 98.1 F


 


Pulse Rate  68 61


 


Respiratory 18 18 16





Rate   


 


Blood Pressure  126/81 134/93











Assessment: 





19 11:06


AOX3, in no acute respiratory distress


Full ROM, ambulating in the unit.


withdrawal symptoms.


Plan: 





continue detox.

## 2019-07-05 RX ADMIN — Medication PRN MG: at 22:32

## 2019-07-05 RX ADMIN — HYDROXYZINE PAMOATE PRN MG: 25 CAPSULE ORAL at 22:32

## 2019-07-05 RX ADMIN — Medication SCH MG: at 22:32

## 2019-07-05 RX ADMIN — Medication SCH TAB: at 10:31

## 2019-07-05 NOTE — PN
[facility] COWS





- Scale


Resting Pulse: 0= [place] 80 or Below


Sweatin= Chills/Flushing


Restless Observation: 0= Sits Still


Pupil Size: 1= Pupils >than Normal


Bone or Joint Aches: 0= None


Runny Nose/ Eye Tearin= None


GI Upset > 30mins: 0= None


Tremor Observation of Outstretched Hands: 1= Tremor Felt, Not Seen


Yawning Observation: 0= None


Anxiety or Irritability: 1=Feels Anxious/Irritable


Goose Flesh Skin: 0=Smooth Skin


COWS Score: 4





BHS Progress Note (SOAP)


Subjective: 





interrupted sleep, 


Objective: 





19 11:42


 Vital Signs











Temperature  97.8 F   19 06:49


 


Pulse Rate  55 L  19 06:49


 


Respiratory Rate  16   19 06:49


 


Blood Pressure  139/68   19 06:49


 


O2 Sat by Pulse Oximetry (%)      








 Laboratory Tests











  19





  15:30 15:30 15:30


 


WBC  3.8 L  


 


RBC  3.92  


 


Hgb  9.9 L  


 


Hct  30.7 L  


 


MCV  78.3 L  


 


MCH  25.3 L  


 


MCHC  32.4  


 


RDW  17.0 H  


 


Plt Count  219  D  


 


MPV  7.3 L  


 


Sodium   137 


 


Potassium   3.4 L 


 


Chloride   105 


 


Carbon Dioxide   28 


 


Anion Gap   4 L 


 


BUN   10.2 


 


Creatinine   0.8 


 


Est GFR (CKD-EPI)AfAm   109.16 


 


Est GFR (CKD-EPI)NonAf   94.18 


 


Random Glucose   109 H 


 


Calcium   8.8 


 


Total Bilirubin   0.5 


 


AST   8 L 


 


ALT   18 


 


Alkaline Phosphatase   67 


 


Total Protein   7.5 


 


Albumin   3.9 


 


Urine Color   


 


Urine Appearance   


 


Urine pH   


 


Ur Specific Gravity   


 


Urine Protein   


 


Urine Glucose (UA)   


 


Urine Ketones   


 


Urine Blood   


 


Urine Nitrite   


 


Urine Bilirubin   


 


Urine Urobilinogen   


 


Ur Leukocyte Esterase   


 


Urine WBC (Auto)   


 


Urine RBC (Auto)   


 


Urine Casts (Auto)   


 


U Pathogenic Cast Auto   


 


U Epithel Cells (Auto)   


 


Urine Crystals (Auto)   


 


Urine Bacteria (Auto)   


 


RPR Titer    Nonreactive














  19





  08:50


 


WBC 


 


RBC 


 


Hgb 


 


Hct 


 


MCV 


 


MCH 


 


MCHC 


 


RDW 


 


Plt Count 


 


MPV 


 


Sodium 


 


Potassium 


 


Chloride 


 


Carbon Dioxide 


 


Anion Gap 


 


BUN 


 


Creatinine 


 


Est GFR (CKD-EPI)AfAm 


 


Est GFR (CKD-EPI)NonAf 


 


Random Glucose 


 


Calcium 


 


Total Bilirubin 


 


AST 


 


ALT 


 


Alkaline Phosphatase 


 


Total Protein 


 


Albumin 


 


Urine Color  Dk yellow


 


Urine Appearance  Turbid


 


Urine pH  5.0  D


 


Ur Specific Gravity  1.031


 


Urine Protein  Negative


 


Urine Glucose (UA)  Negative


 


Urine Ketones  Negative


 


Urine Blood  Negative


 


Urine Nitrite  Negative


 


Urine Bilirubin  Negative


 


Urine Urobilinogen  0.2


 


Ur Leukocyte Esterase  Trace


 


Urine WBC (Auto)  16


 


Urine RBC (Auto)  5


 


Urine Casts (Auto)  23


 


U Pathogenic Cast Auto  None seen


 


U Epithel Cells (Auto)  30.5


 


Urine Crystals (Auto)  Amoprphous urates


 


Urine Bacteria (Auto)  215.3


 


RPR Titer 








pt aox3 , lying in bed in nad. 


Assessment: 





19 11:44


withdrawal sx's 


h/o ovarian ca


anemia 


Plan: 





cont. detox 


increase fluids 


f/up with hem/onc


d/c in am

## 2019-07-06 VITALS — TEMPERATURE: 98.4 F | SYSTOLIC BLOOD PRESSURE: 146 MMHG | DIASTOLIC BLOOD PRESSURE: 97 MMHG | HEART RATE: 72 BPM

## 2019-07-06 NOTE — DS
[facility] Detox Discharge Summary


Admission Date: 


07/01/19





Discharge Date: 07/06/19





- History


Present History: Cannabis Dependence, Opioid Dependence


Additional Comments: 





Pt is medically cleared and discharged today. Pt completed her detox protocol. 

As per 's notes, "Counselor met with client to discuss discharge 

planning.  Camila  is declining aftercare. Clinician discussed  coping skills 

in order to increase client knowledge about relapse prevention". Pt is 

encouraged to follow-up with her pmd and also to follow-up with [facility] outpatient 

program. Pt verbalized understanding. Pt is alert and oriented x3 and in no 

respiratory distress.


Pertinent Past History: 





H/O heroine and cannabis use disorder.





- Physical Exam Results


Vital Signs: 


 Vital Signs











Temperature  98.4 F   07/06/19 09:46


 


Pulse Rate  72   07/06/19 09:46


 


Respiratory Rate  18   07/06/19 09:46


 


Blood Pressure  146/97   07/06/19 09:46


 


O2 Sat by Pulse Oximetry (%)      








 Vital Signs











  07/06/19 07/06/19





  08:08 09:46


 


Temperature 97.9 F 98.4 F


 


Pulse Rate 65 72


 


Respiratory 16 18





Rate  


 


Blood Pressure 131/83 146/97








 Lab Results











WBC  3.8 K/mm3 (4.0-10.0)  L  07/01/19  15:30    


 


RBC  3.92 M/mm3 (3.60-5.2)   07/01/19  15:30    


 


Hgb  9.9 GM/dL (10.7-15.3)  L  07/01/19  15:30    


 


Hct  30.7 % (32.4-45.2)  L  07/01/19  15:30    


 


MCV  78.3 fl (80-96)  L  07/01/19  15:30    


 


MCHC  32.4 g/dl (32.0-36.0)   07/01/19  15:30    


 


RDW  17.0 % (11.6-15.6)  H  07/01/19  15:30    


 


Plt Count  219 K/MM3 (134-434)  D 07/01/19  15:30    


 


Sodium  137 mmol/L (136-145)   07/01/19  15:30    


 


Potassium  3.4 mmol/L (3.5-5.1)  L  07/01/19  15:30    


 


Chloride  105 mmol/L ()   07/01/19  15:30    


 


Carbon Dioxide  28 mmol/L (21-32)   07/01/19  15:30    


 


Anion Gap  4 MMOL/L (8-16)  L  07/01/19  15:30    


 


BUN  10.2 mg/dL (7-18)   07/01/19  15:30    


 


Creatinine  0.8 mg/dL (0.55-1.3)   07/01/19  15:30    


 


Random Glucose  109 mg/dL ()  H  07/01/19  15:30    


 


Calcium  8.8 mg/dL (8.5-10.1)   07/01/19  15:30    








Labs reviewed.


Pertinent Admission Physical Exam Findings: 





withdrawal symptoms.





- Treatment


Hospital Course: Detox Protocol Followed, Detoxed Safely, Responded well, 

Discharged Condition Good





- Medication


Discharge Medications: 


Ambulatory Orders





NK [No Known Home Medication]  07/01/19 











- Diagnosis


(1) GERD (gastroesophageal reflux disease)


Status: Acute   





(2) Heroin abuse


Status: Acute   





(3) Marijuana dependence


Status: Acute   





(4) Nicotine dependence


Status: Acute   


Qualifiers: 


   Nicotine product type: cigarettes   Substance use status: uncomplicated   

Qualified Code(s): F17.210 - Nicotine dependence, cigarettes, uncomplicated   





(5) Opioid dependence with withdrawal


Status: Acute   





(6) Ovarian cancer


Status: Acute   





- AMA


Did Patient Leave Against Medical Advice: No

## 2019-10-27 PROCEDURE — HZ2ZZZZ DETOXIFICATION SERVICES FOR SUBSTANCE ABUSE TREATMENT: ICD-10-PCS | Performed by: ALLERGY & IMMUNOLOGY

## 2019-10-29 ENCOUNTER — HOSPITAL ENCOUNTER (INPATIENT)
Dept: HOSPITAL 74 - YASAS | Age: 39
LOS: 5 days | Discharge: HOME | DRG: 773 | End: 2019-11-03
Attending: ALLERGY & IMMUNOLOGY | Admitting: ALLERGY & IMMUNOLOGY
Payer: COMMERCIAL

## 2019-10-29 VITALS — BODY MASS INDEX: 23.1 KG/M2

## 2019-10-29 DIAGNOSIS — F10.230: ICD-10-CM

## 2019-10-29 DIAGNOSIS — F12.20: ICD-10-CM

## 2019-10-29 DIAGNOSIS — G47.00: ICD-10-CM

## 2019-10-29 DIAGNOSIS — F11.23: Primary | ICD-10-CM

## 2019-10-29 DIAGNOSIS — F16.20: ICD-10-CM

## 2019-10-29 DIAGNOSIS — F17.210: ICD-10-CM

## 2019-10-29 LAB
ALBUMIN SERPL-MCNC: 4.2 G/DL (ref 3.4–5)
ALP SERPL-CCNC: 75 U/L (ref 45–117)
ALT SERPL-CCNC: 31 U/L (ref 13–61)
ANION GAP SERPL CALC-SCNC: 3 MMOL/L (ref 8–16)
AST SERPL-CCNC: 26 U/L (ref 15–37)
BILIRUB SERPL-MCNC: 0.7 MG/DL (ref 0.2–1)
BUN SERPL-MCNC: 14.4 MG/DL (ref 7–18)
CALCIUM SERPL-MCNC: 9.5 MG/DL (ref 8.5–10.1)
CHLORIDE SERPL-SCNC: 104 MMOL/L (ref 98–107)
CO2 SERPL-SCNC: 30 MMOL/L (ref 21–32)
CREAT SERPL-MCNC: 0.6 MG/DL (ref 0.55–1.3)
DEPRECATED RDW RBC AUTO: 16.7 % (ref 11.6–15.6)
GLUCOSE SERPL-MCNC: 87 MG/DL (ref 74–106)
HCT VFR BLD CALC: 34.1 % (ref 32.4–45.2)
HGB BLD-MCNC: 10.9 GM/DL (ref 10.7–15.3)
MCH RBC QN AUTO: 25.9 PG (ref 25.7–33.7)
MCHC RBC AUTO-ENTMCNC: 31.9 G/DL (ref 32–36)
MCV RBC: 81.2 FL (ref 80–96)
PLATELET # BLD AUTO: 262 K/MM3 (ref 134–434)
PMV BLD: 7.2 FL (ref 7.5–11.1)
POTASSIUM SERPLBLD-SCNC: 4.4 MMOL/L (ref 3.5–5.1)
PROT SERPL-MCNC: 7.8 G/DL (ref 6.4–8.2)
RBC # BLD AUTO: 4.2 M/MM3 (ref 3.6–5.2)
SODIUM SERPL-SCNC: 137 MMOL/L (ref 136–145)
WBC # BLD AUTO: 4.9 K/MM3 (ref 4–10)

## 2019-10-29 PROCEDURE — G0008 ADMIN INFLUENZA VIRUS VAC: HCPCS

## 2019-10-29 PROCEDURE — G0009 ADMIN PNEUMOCOCCAL VACCINE: HCPCS

## 2019-10-29 RX ADMIN — Medication SCH MG: at 22:21

## 2019-10-29 RX ADMIN — Medication SCH TAB: at 11:28

## 2019-10-29 RX ADMIN — Medication PRN MG: at 22:21

## 2019-10-29 NOTE — HP
COWS





- Scale


Resting Pulse: 0= [place] 80 or Below


Sweatin= Chills/Flushing


Restless Observation: 3= Extraneous Movement


Pupil Size: 0= Normal to Room Light


Bone or Joint Aches: 4=Acute Joint/Muscle Pain


Runny Nose/ Eye Tearin= Runny Nose/Eyes


GI Upset > 30mins: 2= Nausea/Diarrhea


Tremor Observation: 1= Tremor Felt, Not Seen


Yawning Observation: 0= None


Anxiety or Irritability: 0= None


Goose Flesh Skin: 0=Smooth Skin


COWS Score: 13





CIWA Score


Nausea/Vomitin-Int. Nausea w/Dry Heave


Muscle Tremors: 1-None Visible, but Felt


Anxiety: 0-No Anxiety, at Ease


Agitation: 1-Slight > Activity


Paroxysmal Sweats: No Perspiration


Orientation: 2-Disoriented Date<2 days


Tacttile Disturbances: 0-None


Auditory Disturbances: 0-None


Visual Disturbances: 2-Mild Sensitivity


Headache: 2-Mild


CIWA-Ar Total Score: 12





- Admission Criteria


[facility] Guidelines: Admission for Medically Managed Detox: 


Requires at least one of the followin. CIWA greater than 12


2. Seizures within the past 24 hours


3. Delirium tremens within the past 24 hours


4. Hallucinations within the past 24 hours


5. Acute intervention needed for co  occurring medical disorder


6. Acute intervention needed for co  occurring psychiatric disorder


7. Severe withdrawal that cannot be handled at a lower level of care (continued


    vomiting, continued diarrhea, abnormal vital signs) requiring intravenous


    medication and/or fluids


8. Pregnancy








Admitting History and Physical





- Past Medical History


...LMP: 10/01/19





- Smoking History


Smoking history: Former smoker


Have you smoked in the past 12 months: No


Aproximately how many cigarettes per day: 20


If you are a former smoker, when did you quit?: 1 yr ago





- Alcohol/Substance Use


Hx Alcohol Use: No





Admission ROS S





- HPI


Allergies/Adverse Reactions: 


 Allergies











Allergy/AdvReac Type Severity Reaction Status Date / Time


 


No Known Allergies Allergy   Verified 10/29/19 08:30











History of Present Illness: 





 This report was requested by: Fawn Jameson | Reference #: [id number]


Others' Prescriptions


Patient Name:    Camila Rodriguez    YOB: 1980


Address:    [address] [place] [address]    Sex:    Female


Rx Written    Rx Dispensed    Drug    Quantity    Days Supply    Prescriber Name


2019    2019    chlordiazepoxide 25 mg capsule    26    5    Kojo Serrano MD





pt here requesting detox from  heroin use , claims  50 bags  heroin / day  via  

inhalation  , first age of use early 20's , longest  sobriety  [id number] ,  

most recent  detox  at this facility  2019 , relapsed  immediately after d

/c , latest use this  morning . 


PCP  :  2 bags / day 


denies  other  illicits 


etoh - 1  bottle vodka / day x 1  month , reports tremors if not drinking  , + 

blackouts, denies seizures  , latest use  2 days ago  . 


tobacco : 1  ppd  


pmhx : denies  


denies SI / Hi 


lmp Oct 2019   ages [age],7,5,4,3   with  maternal GM and  her brother in 

[facility]  


legal :  court  in August  , did not go  , arrest warrant in place  


Exam Limitations: Clinical Condition





- Ebola screening


Have you traveled outside of the country in the last 21 days: No


Have you had contact with anyone from an Ebola affected area: No


Do you have a fever: No





- Review of Systems


Constitutional: Loss of Appetite


EENT: reports: Other (glasses)


Respiratory: reports: No Symptoms reported


Cardiac: reports: No Symptoms Reported


GI: reports: See HPI


: reports: No Symptoms Reported


Musculoskeletal: reports: See HPI


Integumentary: reports: No Symptoms Reported


Neuro: reports: See HPI


Endocrine: reports: No Symptoms Reported


Psychiatric: reports: Orientated x3, Anxious, Depressed





Patient History





- Patient Medical History


Hx Anemia: No


Hx Asthma: No


Hx Chronic Obstructive Pulmonary Disease (COPD): No


Hx Cancer: No


Hx Cardiac Disorders: No


Hx Congestive Heart Failure: No


Hx Hypertension: No


Hx Hypercholesterolemia: No


Hx Pacemaker: No


HX Cerebrovascular Accident: No


Hx Seizures: No


Hx Dementia: No


Hx Diabetes: No


Hx Gastrointestinal Disorders: No


Hx Liver Disease: No


Hx Genitourinary Disorders: No


Hx Sexually Transmitted Disorders: No


Hx Renal Disease (ESRD): No


Hx Thyroid Disease: No


Hx Human Immunodeficiency Virus (HIV): No


Hx Hepatitis C: No


Hx Depression: No


Hx Suicide Attempt: No


Hx Bipolar Disorder: No


Hx Schizophrenia: No





- Patient Surgical History


Past Surgical History: No





- PPD History


Date: 19


Results: 0mm





- Reproductive History


Last Menstrual Period: 19





- Smoking Cessation


Smoking history: Former smoker


Have you smoked in the past 12 months: No


Aproximately how many cigarettes per day: 20


If you are a former smoker, when did you quit?: 1 yr ago


Cigars Per Day: 0


Hx Chewing Tobacco Use: No


Initiated information on smoking cessation: Yes


'Breaking Loose' booklet given: 10/29/19





- Substances abused


  ** Heroin


Substance route: Inhalation


Frequency: Daily


Amount used: 50 bags


Age of first use: 20


Date of last use: 10/28/19





  ** Marijuana/Hashish


Substance route: Smoking


Frequency: Daily


Amount used: 2 bags


Age of first use: 20


Date of last use: 19





  ** PCP


Substance route: Smoking


Frequency: Daily


Amount used: 2 blunts


Age of first use: 22


Date of last use: 10/28/19





  ** Alcohol


Substance route: Oral


Frequency: Daily


Amount used: 1 bottle of liquor


Age of first use: 38


Date of last use: 10/28/19





Admission Physical Exam [facility]





- Vital Signs


Vital Signs: 


 Vital Signs - 24 hr











  10/29/19





  08:38


 


Temperature 97.6 F


 


Pulse Rate 73


 


Respiratory 16





Rate 


 


Blood Pressure 138/94














- Physical


General Appearance: Yes: Disheveled, Mild Distress, Anxious


HEENTM: Yes: EOMI, Hearing grossly Normal, Normocephalic, Normal Voice


Respiratory: Yes: Chest Non-Tender, Lungs Clear, Normal Breath Sounds, No 

Respiratory Distress, No Accessory Muscle Use


Neck: Yes: No masses,lesions,Nodules, Trachea in good position


Cardiology: Yes: Regular Rhythm, Regular Rate, S1, S2, Other (EKG  QTc  435 ms  

19)


Abdominal: Yes: Normal Bowel Sounds, Non Tender, Soft


Back: Yes: Normal Inspection


Musculoskeletal: Yes: full range of Motion, Gait Steady


Extremities: Yes: Normal Capillary Refill, Normal Inspection, Normal Range of 

Motion, Non-Tender


Neurological: Yes: Fully Oriented, Alert, Motor Strength 5/5, Normal Mood/Affect


Integumentary: Yes: Warm, Other (skin discoloration lisa ue  dorsum of hands and

   distal FA  - reprots  known hx of eczema)





- Diagnostic


(1) Nicotine dependence


Current Visit: Yes   Status: Chronic   


Qualifiers: 


   Nicotine product type: cigarettes   Substance use status: uncomplicated   

Qualified Code(s): F17.210 - Nicotine dependence, cigarettes, uncomplicated   





(2) Opioid dependence with withdrawal


Current Visit: Yes   Status: Chronic   





(3) Alcohol abuse


Current Visit: Yes   Status: Acute   





Breathalyzer





- Breathalyzer


Breathalyzer: 0





Urine Drug Screen





- Test Device


Lot number: [id number]


Expiration date: 21





- Control


Is test valid?: Yes





- Results


Drug screen NEGATIVE: No


Urine drug screen results: THC-Marijuana, DARLYN-Cocaine, FEN-Fentanyl, MOP-Opiates

, BZO-Benzodiazepines





Inpatient Rehab Admission





- Rehab Decision to Admit


Inpatient rehab admission?: No

## 2019-10-30 RX ADMIN — Medication SCH MG: at 23:04

## 2019-10-30 RX ADMIN — Medication SCH TAB: at 10:16

## 2019-10-30 RX ADMIN — HYDROXYZINE PAMOATE PRN MG: 25 CAPSULE ORAL at 23:01

## 2019-10-30 NOTE — PN
[facility] CIWA





- CIWA Score


Nausea/Vomitin-No Nausea/No Vomiting


Muscle Tremors: 4-Moderate,w/Arms Extend


Anxiety: 4-Mod. Anxious/Guarded


Agitation: 4-Moderately Restless


Paroxysmal Sweats: 3


Orientation: 0-Oriented


Tacttile Disturbances: 0-None


Auditory Disturbances: 0-None


Visual Disturbances: 0-None


Headache: 0-None Present


CIWA-Ar Total Score: 15





BHS COWS





- Scale


Resting Pulse: 0= [place] 80 or Below


Sweatin= Chills/Flushing


Restless Observation: 1= Difficult to Sit Still


Pupil Size: 0= Normal to Room Light


Bone or Joint Aches: 2= Severe Diffuse Aches


Runny Nose/ Eye Tearin= Runny Nose/Eyes


GI Upset > 30mins: 1= Stomach Cramp


Tremor Observation of Outstretched Hands: 1= Tremor Felt, Not Seen


Yawning Observation: 1= 1-2x During Session


Anxiety or Irritability: 2=Irritable/Anxious


Goose Flesh Skin: 0=Smooth Skin


COWS Score: 11





BHS Progress Note (SOAP)


Subjective: 





irritable


agitation


sweats


shakes


chills


diarrhea


Objective: 





10/30/19 11:33


 Vital Signs











Temperature  97.7 F   10/30/19 09:28


 


Pulse Rate  72   10/30/19 09:28


 


Respiratory Rate  18   10/30/19 09:28


 


Blood Pressure  136/81   10/30/19 09:28


 


O2 Sat by Pulse Oximetry (%)      








 Laboratory Tests











  10/29/19 10/29/19 10/29/19





  11:30 11:30 11:30


 


WBC  4.9  


 


RBC  4.20  


 


Hgb  10.9  


 


Hct  34.1  


 


MCV  81.2  


 


MCH  25.9  


 


MCHC  31.9 L  


 


RDW  16.7 H  


 


Plt Count  262  


 


MPV  7.2 L  


 


Sodium   137 


 


Potassium   4.4 


 


Chloride   104 


 


Carbon Dioxide   30 


 


Anion Gap   3 L 


 


BUN   14.4 


 


Creatinine   0.6 


 


Est GFR (CKD-EPI)AfAm   134.96 


 


Est GFR (CKD-EPI)NonAf   116.44 


 


Random Glucose   87 


 


Calcium   9.5 


 


Total Bilirubin   0.7 


 


AST   26 


 


ALT   31 


 


Alkaline Phosphatase   75 


 


Total Protein   7.8 


 


Albumin   4.2 


 


RPR Titer    Nonreactive


 


HIV 1&2 Antibody Screen   


 


HIV P24 Antigen   














  10/29/19





  11:30


 


WBC 


 


RBC 


 


Hgb 


 


Hct 


 


MCV 


 


MCH 


 


MCHC 


 


RDW 


 


Plt Count 


 


MPV 


 


Sodium 


 


Potassium 


 


Chloride 


 


Carbon Dioxide 


 


Anion Gap 


 


BUN 


 


Creatinine 


 


Est GFR (CKD-EPI)AfAm 


 


Est GFR (CKD-EPI)NonAf 


 


Random Glucose 


 


Calcium 


 


Total Bilirubin 


 


AST 


 


ALT 


 


Alkaline Phosphatase 


 


Total Protein 


 


Albumin 


 


RPR Titer 


 


HIV 1&2 Antibody Screen  Negative


 


HIV P24 Antigen  Negative








labs noted


aaox3


ambulating


no acute distress


Assessment: 





10/30/19 11:33


withdrawals sx


Plan: 





continue detox


increase fluids


pepto prn

## 2019-10-31 RX ADMIN — METHOCARBAMOL PRN MG: 500 TABLET ORAL at 22:20

## 2019-10-31 RX ADMIN — METHOCARBAMOL PRN MG: 500 TABLET ORAL at 12:13

## 2019-10-31 RX ADMIN — HYDROXYZINE PAMOATE PRN MG: 25 CAPSULE ORAL at 22:20

## 2019-10-31 RX ADMIN — Medication SCH TAB: at 10:25

## 2019-10-31 RX ADMIN — Medication SCH MG: at 22:20

## 2019-10-31 NOTE — DS
Physical Exam: 


SUBJECTIVE: Patient seen and examined








OBJECTIVE:





 Vital Signs











 Period  Temp  Pulse  Resp  BP Sys/Bronson  Pulse Ox


 


 Last 24 Hr  97.1 F-98.2 F    16-20  102-125/52-72  








PHYSICAL EXAM





GENERAL: The patient is awake, alert, and fully oriented, in no acute distress.


HEAD: Normal with no signs of trauma.


EYES: PERRL, extraocular movements intact, sclera anicteric, conjunctiva clear. 


ENT: Ears normal, nares patent, oropharynx clear without exudates, moist mucous 

membranes.


NECK: Trachea midline, full range of motion, supple. 


LUNGS: Breath sounds equal, clear to auscultation bilaterally, no wheezes, no 

crackles, no accessory muscle use. 


HEART: Regular rate and rhythm, S1, S2 without murmur, rub or gallop.


ABDOMEN: Soft, nontender, nondistended, normoactive bowel sounds, no guarding, 

no rebound, no hepatosplenomegaly, no masses.


EXTREMITIES: 2+ pulses, warm, well-perfused, no edema. 


NEUROLOGICAL: Cranial nerves II through XII grossly intact. Normal speech, gait 

not observed.


PSYCH: Normal mood, normal affect.


SKIN: Warm, dry, normal turgor, no rashes or lesions noted.





LABS





HOSPITAL COURSE:





Date of Admission:10/29/19





Date of Discharge: 10/31/19














Discharge Summary


Reason For Visit: DETOX - WALK- IN


Current Active Problems





Alcohol abuse (Acute)


Nicotine dependence (Chronic)


Opioid dependence with withdrawal (Chronic)











- Instructions


Diet, Activity, Other Instructions: 


Please follow up with primary doctors





- Home Medications


Comprehensive Discharge Medication List: 


Ambulatory Orders





NK [No Known Home Medication]  07/01/19 











ATTENDING PHYSICIAN STATEMENT





I saw and evaluated the patient.


I reviewed the resident's note and discussed the case with the resident.


I agree with the resident's findings and plan as documented.








SUBJECTIVE:








OBJECTIVE:








ASSESSMENT AND PLAN:

## 2019-10-31 NOTE — PN
S CIWA





- CIWA Score


Nausea/Vomitin-Mild Nausea/No Vomiting


Muscle Tremors: 3


Anxiety: 3


Agitation: 3


Paroxysmal Sweats: 3


Orientation: 0-Oriented


Tacttile Disturbances: 0-None


Auditory Disturbances: 0-None


Visual Disturbances: 0-None


Headache: 0-None Present


CIWA-Ar Total Score: 13





BHS COWS





- Scale


Resting Pulse: 0= [place] 80 or Below


Sweatin= Chills/Flushing


Restless Observation: 1= Difficult to Sit Still


Pupil Size: 0= Normal to Room Light


Bone or Joint Aches: 1= Mild Discomfort


Runny Nose/ Eye Tearin= None


GI Upset > 30mins: 2= Nausea/Diarrhea


Tremor Observation of Outstretched Hands: 2= Slight Tremor Visible


Yawning Observation: 2= >3x During Session


Anxiety or Irritability: 2=Irritable/Anxious


Goose Flesh Skin: 0=Smooth Skin


COWS Score: 11





BHS Progress Note (SOAP)


Subjective: 





sweats


shakes


nausea


body aches


interrupted sleep


Objective: 





10/31/19 10:11


 Vital Signs











Temperature  97.1 F L  10/31/19 09:32


 


Pulse Rate  67   10/31/19 09:32


 


Respiratory Rate  17   10/31/19 09:32


 


Blood Pressure  108/53 L  10/31/19 09:32


 


O2 Sat by Pulse Oximetry (%)      








 Laboratory Tests











  10/29/19 10/29/19 10/29/19





  11:30 11:30 11:30


 


WBC  4.9  


 


RBC  4.20  


 


Hgb  10.9  


 


Hct  34.1  


 


MCV  81.2  


 


MCH  25.9  


 


MCHC  31.9 L  


 


RDW  16.7 H  


 


Plt Count  262  


 


MPV  7.2 L  


 


Sodium   137 


 


Potassium   4.4 


 


Chloride   104 


 


Carbon Dioxide   30 


 


Anion Gap   3 L 


 


BUN   14.4 


 


Creatinine   0.6 


 


Est GFR (CKD-EPI)AfAm   134.96 


 


Est GFR (CKD-EPI)NonAf   116.44 


 


Random Glucose   87 


 


Calcium   9.5 


 


Total Bilirubin   0.7 


 


AST   26 


 


ALT   31 


 


Alkaline Phosphatase   75 


 


Total Protein   7.8 


 


Albumin   4.2 


 


RPR Titer    Nonreactive


 


HIV 1&2 Antibody Screen   


 


HIV P24 Antigen   














  10/29/19





  11:30


 


WBC 


 


RBC 


 


Hgb 


 


Hct 


 


MCV 


 


MCH 


 


MCHC 


 


RDW 


 


Plt Count 


 


MPV 


 


Sodium 


 


Potassium 


 


Chloride 


 


Carbon Dioxide 


 


Anion Gap 


 


BUN 


 


Creatinine 


 


Est GFR (CKD-EPI)AfAm 


 


Est GFR (CKD-EPI)NonAf 


 


Random Glucose 


 


Calcium 


 


Total Bilirubin 


 


AST 


 


ALT 


 


Alkaline Phosphatase 


 


Total Protein 


 


Albumin 


 


RPR Titer 


 


HIV 1&2 Antibody Screen  Negative


 


HIV P24 Antigen  Negative








labs noted


aaox3


ambulating


no acute distress








Assessment: 





10/31/19 10:12


withdrawals








Plan: 





continue detox


increase fluids


zofran sl prn

## 2019-11-01 RX ADMIN — HYDROCORTISONE SCH: 1 CREAM TOPICAL at 18:55

## 2019-11-01 RX ADMIN — HYDROXYZINE PAMOATE PRN MG: 25 CAPSULE ORAL at 10:32

## 2019-11-01 RX ADMIN — HYDROXYZINE PAMOATE PRN MG: 25 CAPSULE ORAL at 22:21

## 2019-11-01 RX ADMIN — NICOTINE SCH: 7 PATCH TRANSDERMAL at 14:58

## 2019-11-01 RX ADMIN — HYDROCORTISONE SCH APPLIC: 1 CREAM TOPICAL at 22:23

## 2019-11-01 RX ADMIN — HYDROCORTISONE SCH APPLIC: 1 CREAM TOPICAL at 14:28

## 2019-11-01 RX ADMIN — Medication SCH TAB: at 10:32

## 2019-11-01 RX ADMIN — Medication SCH MG: at 22:21

## 2019-11-01 RX ADMIN — METHOCARBAMOL PRN MG: 500 TABLET ORAL at 06:12

## 2019-11-01 RX ADMIN — Medication PRN MG: at 22:29

## 2019-11-01 RX ADMIN — METHOCARBAMOL PRN MG: 500 TABLET ORAL at 22:21

## 2019-11-01 NOTE — PN
[facility] CIWA





- CIWA Score


Nausea/Vomitin-No Nausea/No Vomiting


Muscle Tremors: 2


Anxiety: 2


Agitation: 2


Paroxysmal Sweats: 2


Orientation: 0-Oriented


Tacttile Disturbances: 0-None


Auditory Disturbances: 0-None


Visual Disturbances: 0-None


Headache: 0-None Present


CIWA-Ar Total Score: 8





BHS COWS





- Scale


Resting Pulse: 1= [place] 


Sweatin= Chills/Flushing


Restless Observation: 1= Difficult to Sit Still


Pupil Size: 0= Normal to Room Light


Bone or Joint Aches: 1= Mild Discomfort


Runny Nose/ Eye Tearin= Nasal Congestion


GI Upset > 30mins: 0= None


Tremor Observation of Outstretched Hands: 1= Tremor Felt, Not Seen


Yawning Observation: 1= 1-2x During Session


Anxiety or Irritability: 1=Feels Anxious/Irritable


Goose Flesh Skin: 0=Smooth Skin


COWS Score: 8





BHS Progress Note (SOAP)


Subjective: 





sweats


shakes


i have dry/eczema cream


Objective: 





19 11:59


 Vital Signs











Temperature  98.1 F   19 09:40


 


Pulse Rate  86   19 09:40


 


Respiratory Rate  18   19 09:40


 


Blood Pressure  127/71   19 09:40


 


O2 Sat by Pulse Oximetry (%)      








 Laboratory Tests











  10/29/19 10/29/19 10/29/19





  11:30 11:30 11:30


 


WBC  4.9  


 


RBC  4.20  


 


Hgb  10.9  


 


Hct  34.1  


 


MCV  81.2  


 


MCH  25.9  


 


MCHC  31.9 L  


 


RDW  16.7 H  


 


Plt Count  262  


 


MPV  7.2 L  


 


Sodium   137 


 


Potassium   4.4 


 


Chloride   104 


 


Carbon Dioxide   30 


 


Anion Gap   3 L 


 


BUN   14.4 


 


Creatinine   0.6 


 


Est GFR (CKD-EPI)AfAm   134.96 


 


Est GFR (CKD-EPI)NonAf   116.44 


 


Random Glucose   87 


 


Calcium   9.5 


 


Total Bilirubin   0.7 


 


AST   26 


 


ALT   31 


 


Alkaline Phosphatase   75 


 


Total Protein   7.8 


 


Albumin   4.2 


 


RPR Titer    Nonreactive


 


HIV 1&2 Antibody Screen   


 


HIV P24 Antigen   














  10/29/19





  11:30


 


WBC 


 


RBC 


 


Hgb 


 


Hct 


 


MCV 


 


MCH 


 


MCHC 


 


RDW 


 


Plt Count 


 


MPV 


 


Sodium 


 


Potassium 


 


Chloride 


 


Carbon Dioxide 


 


Anion Gap 


 


BUN 


 


Creatinine 


 


Est GFR (CKD-EPI)AfAm 


 


Est GFR (CKD-EPI)NonAf 


 


Random Glucose 


 


Calcium 


 


Total Bilirubin 


 


AST 


 


ALT 


 


Alkaline Phosphatase 


 


Total Protein 


 


Albumin 


 


RPR Titer 


 


HIV 1&2 Antibody Screen  Negative


 


HIV P24 Antigen  Negative








labs noted


aaox3


ambulating


no acute distress


Assessment: 





19 12:00


withdrawals


Plan: 





continue detox


increase fluids


hydrocortizone 1%cream 


aveeno soap

## 2019-11-02 VITALS — TEMPERATURE: 98.1 F

## 2019-11-02 RX ADMIN — NICOTINE SCH: 7 PATCH TRANSDERMAL at 10:36

## 2019-11-02 RX ADMIN — METHOCARBAMOL PRN MG: 500 TABLET ORAL at 17:49

## 2019-11-02 RX ADMIN — Medication SCH TAB: at 10:36

## 2019-11-02 RX ADMIN — Medication SCH MG: at 22:23

## 2019-11-02 RX ADMIN — HYDROCORTISONE SCH APPLIC: 1 CREAM TOPICAL at 22:24

## 2019-11-02 RX ADMIN — HYDROCORTISONE SCH: 1 CREAM TOPICAL at 18:20

## 2019-11-02 RX ADMIN — METHOCARBAMOL PRN MG: 500 TABLET ORAL at 10:38

## 2019-11-02 RX ADMIN — HYDROCORTISONE SCH APPLIC: 1 CREAM TOPICAL at 13:34

## 2019-11-02 RX ADMIN — HYDROCORTISONE SCH APPLIC: 1 CREAM TOPICAL at 10:36

## 2019-11-02 NOTE — PN
[facility] CIWA





- CIWA Score


Nausea/Vomitin-No Nausea/No Vomiting


Muscle Tremors: 1-None Visible, but Felt


Anxiety: 1-Mildly Anxious


Agitation: 1-Slight > Activity


Paroxysmal Sweats: No Perspiration


Orientation: 0-Oriented


Tacttile Disturbances: 0-None


Auditory Disturbances: 0-None


Visual Disturbances: 0-None


Headache: 0-None Present


CIWA-Ar Total Score: 3





BHS COWS





- Scale


Resting Pulse: 0= [place] 80 or Below


Sweatin= No chills or Flushing


Restless Observation: 1= Difficult to Sit Still


Pupil Size: 0= Normal to Room Light


Bone or Joint Aches: 1= Mild Discomfort


Runny Nose/ Eye Tearin= None


GI Upset > 30mins: 0= None


Tremor Observation of Outstretched Hands: 0= None


Yawning Observation: 0= None


Anxiety or Irritability: 1=Feels Anxious/Irritable


Goose Flesh Skin: 0=Smooth Skin


COWS Score: 3





BHS Progress Note (SOAP)


Subjective: 





irritable


agitation


feeling better


Objective: 





19 11:39


 Vital Signs











Temperature  98.2 F   19 09:29


 


Pulse Rate  77   19 09:29


 


Respiratory Rate  18   19 09:29


 


Blood Pressure  112/50 L  19 09:29


 


O2 Sat by Pulse Oximetry (%)      








aaox3


ambulating


no acute distress


Assessment: 





19 11:39


mild withdrawals


Plan: 





complete with detox regimen


d/c in am

## 2019-11-03 VITALS — DIASTOLIC BLOOD PRESSURE: 53 MMHG | HEART RATE: 91 BPM | SYSTOLIC BLOOD PRESSURE: 103 MMHG

## 2019-11-03 NOTE — DS
[facility] Detox Discharge Summary


Admission Date: 


10/29/19





Discharge Date: 11/03/19





- History


Present History: Alcohol Dependence, Opioid Dependence, Sedative Dependence


Additional Comments: 





Patient successfully completed detox and discharged safely, patient refused 

inpatient rehab. Patient instructed to follow up with PCP within 1-2 weeks.


Pertinent Past History: 





Denies pmhx





- Physical Exam Results


Vital Signs: 


 Vital Signs











Temperature  98.1 F   11/03/19 09:44


 


Pulse Rate  91 H  11/03/19 09:44


 


Respiratory Rate  16   11/03/19 09:44


 


Blood Pressure  103/53 L  11/03/19 09:44


 


O2 Sat by Pulse Oximetry (%)      











Pertinent Admission Physical Exam Findings: 





Withdrawal sxs





 Laboratory Tests











  10/29/19 10/29/19 10/29/19





  11:30 11:30 11:30


 


WBC  4.9  


 


RBC  4.20  


 


Hgb  10.9  


 


Hct  34.1  


 


MCV  81.2  


 


MCH  25.9  


 


MCHC  31.9 L  


 


RDW  16.7 H  


 


Plt Count  262  


 


MPV  7.2 L  


 


Sodium   137 


 


Potassium   4.4 


 


Chloride   104 


 


Carbon Dioxide   30 


 


Anion Gap   3 L 


 


BUN   14.4 


 


Creatinine   0.6 


 


Est GFR (CKD-EPI)AfAm   134.96 


 


Est GFR (CKD-EPI)NonAf   116.44 


 


Random Glucose   87 


 


Calcium   9.5 


 


Total Bilirubin   0.7 


 


AST   26 


 


ALT   31 


 


Alkaline Phosphatase   75 


 


Total Protein   7.8 


 


Albumin   4.2 


 


RPR Titer    Nonreactive


 


HIV 1&2 Antibody Screen   


 


HIV P24 Antigen   














  10/29/19





  11:30


 


WBC 


 


RBC 


 


Hgb 


 


Hct 


 


MCV 


 


MCH 


 


MCHC 


 


RDW 


 


Plt Count 


 


MPV 


 


Sodium 


 


Potassium 


 


Chloride 


 


Carbon Dioxide 


 


Anion Gap 


 


BUN 


 


Creatinine 


 


Est GFR (CKD-EPI)AfAm 


 


Est GFR (CKD-EPI)NonAf 


 


Random Glucose 


 


Calcium 


 


Total Bilirubin 


 


AST 


 


ALT 


 


Alkaline Phosphatase 


 


Total Protein 


 


Albumin 


 


RPR Titer 


 


HIV 1&2 Antibody Screen  Negative


 


HIV P24 Antigen  Negative








Labs reviewed





- Treatment


Hospital Course: Detox Protocol Followed, Detoxed Safely, Responded well, 

Discharged Condition Good





- Medication


Discharge Medications: 


Ambulatory Orders





NK [No Known Home Medication]  07/01/19 











- Diagnosis


(1) Alcohol dependence with withdrawal, uncomplicated


Status: Acute   





(2) PCP dependence


Status: Chronic   





(3) Opioid dependence with withdrawal


Status: Chronic   





(4) Marijuana dependence


Status: Chronic   





- AMA


Did Patient Leave Against Medical Advice: No (Instructed to follow up with PCP 

within 1-2 weeks)

## 2019-12-13 ENCOUNTER — HOSPITAL ENCOUNTER (INPATIENT)
Dept: HOSPITAL 74 - YASAS | Age: 39
LOS: 2 days | Discharge: LEFT BEFORE DISCHARGE | DRG: 770 | End: 2019-12-15
Attending: ALLERGY & IMMUNOLOGY | Admitting: ALLERGY & IMMUNOLOGY
Payer: COMMERCIAL

## 2019-12-13 VITALS — BODY MASS INDEX: 22.3 KG/M2

## 2019-12-13 DIAGNOSIS — F16.20: ICD-10-CM

## 2019-12-13 DIAGNOSIS — F11.23: Primary | ICD-10-CM

## 2019-12-13 DIAGNOSIS — D64.9: ICD-10-CM

## 2019-12-13 DIAGNOSIS — F17.210: ICD-10-CM

## 2019-12-13 DIAGNOSIS — F12.20: ICD-10-CM

## 2019-12-13 DIAGNOSIS — E87.1: ICD-10-CM

## 2019-12-13 LAB
ALBUMIN SERPL-MCNC: 3.6 G/DL (ref 3.4–5)
ALP SERPL-CCNC: 82 U/L (ref 45–117)
ALT SERPL-CCNC: 36 U/L (ref 13–61)
ANION GAP SERPL CALC-SCNC: 4 MMOL/L (ref 8–16)
AST SERPL-CCNC: 16 U/L (ref 15–37)
BILIRUB SERPL-MCNC: 0.2 MG/DL (ref 0.2–1)
BUN SERPL-MCNC: 14.1 MG/DL (ref 7–18)
CALCIUM SERPL-MCNC: 9 MG/DL (ref 8.5–10.1)
CHLORIDE SERPL-SCNC: 103 MMOL/L (ref 98–107)
CO2 SERPL-SCNC: 27 MMOL/L (ref 21–32)
CREAT SERPL-MCNC: 0.7 MG/DL (ref 0.55–1.3)
DEPRECATED RDW RBC AUTO: 17.1 % (ref 11.6–15.6)
GLUCOSE SERPL-MCNC: 83 MG/DL (ref 74–106)
HCT VFR BLD CALC: 32.2 % (ref 32.4–45.2)
HGB BLD-MCNC: 10.6 GM/DL (ref 10.7–15.3)
MCH RBC QN AUTO: 26.8 PG (ref 25.7–33.7)
MCHC RBC AUTO-ENTMCNC: 32.9 G/DL (ref 32–36)
MCV RBC: 81.5 FL (ref 80–96)
PLATELET # BLD AUTO: 285 K/MM3 (ref 134–434)
PMV BLD: 6.9 FL (ref 7.5–11.1)
POTASSIUM SERPLBLD-SCNC: 4.2 MMOL/L (ref 3.5–5.1)
PROT SERPL-MCNC: 7.2 G/DL (ref 6.4–8.2)
RBC # BLD AUTO: 3.95 M/MM3 (ref 3.6–5.2)
SODIUM SERPL-SCNC: 134 MMOL/L (ref 136–145)
WBC # BLD AUTO: 4.9 K/MM3 (ref 4–10)

## 2019-12-13 PROCEDURE — HZ2ZZZZ DETOXIFICATION SERVICES FOR SUBSTANCE ABUSE TREATMENT: ICD-10-PCS | Performed by: ALLERGY & IMMUNOLOGY

## 2019-12-13 RX ADMIN — Medication SCH APPLIC: at 21:38

## 2019-12-13 RX ADMIN — Medication SCH MG: at 21:38

## 2019-12-13 NOTE — HP
COWS





- Scale


Resting Pulse: 1= OK 


Sweatin= Chills/Flushing


Restless Observation: 1= Difficult to Sit Still


Pupil Size: 0= Normal to Room Light


Bone or Joint Aches: 2= Severe Diffuse Aches


Runny Nose/ Eye Tearin= None


GI Upset > 30mins: 2= Nausea/Diarrhea


Tremor Observation: 1= Tremor Felt, Not Seen


Yawning Observation: 0= None


Anxiety or Irritability: 2=Irritable/Anxious


Goose Flesh Skin: 0=Smooth Skin


COWS Score: 10





CIWA Score





- Admission Criteria


[facility] Guidelines: Admission for Medically Managed Detox: 


Requires at least one of the followin. CIWA greater than 12


2. Seizures within the past 24 hours


3. Delirium tremens within the past 24 hours


4. Hallucinations within the past 24 hours


5. Acute intervention needed for co  occurring medical disorder


6. Acute intervention needed for co  occurring psychiatric disorder


7. Severe withdrawal that cannot be handled at a lower level of care (continued


    vomiting, continued diarrhea, abnormal vital signs) requiring intravenous


    medication and/or fluids


8. Pregnancy








Admitting History and Physical





- Past Medical History


...LMP: 19





- Smoking History


Smoking history: Former smoker


Have you smoked in the past 12 months: Yes


Aproximately how many cigarettes per day: 20


If you are a former smoker, when did you quit?: 1 yr ago





- Alcohol/Substance Use


Hx Alcohol Use: No





Admission [facility]





- [facility]


Allergies/Adverse Reactions: 


 Allergies











Allergy/AdvReac Type Severity Reaction Status Date / Time


 


No Known Allergies Allergy   Verified 19 09:29











History of Present Illness: 





pt here requesting detox from  heroin use , claims  40-50  bags  heroin / day  

via  inhalation  , first age of use early 20's , longest  sobriety  [id number] ,

rehab [facility] x  7 days  ,  relapsed after d/c , latest use yesterday 


PCP  :  2 bags / day 


denies  other  illicits 


etoh - denies  recent use  since previous detox  


tobacco : 1  ppd  


pmhx : denies  


denies SI / Hi 


lmp Oct 2019  upt neg  2019   ages [age],7,5,4,3   with  maternal GM and  

her brother in [place]  


 


Exam Limitations: Clinical Condition





- Ebola screening


Have you traveled outside of the country in the last 21 days: No


Have you had contact with anyone from an Ebola affected area: No


Do you have a fever: No





- Review of Systems


Constitutional: Loss of Appetite


EENT: reports: Other (glasses  , denies dysphagia)


Respiratory: reports: No Symptoms reported


Cardiac: reports: No Symptoms Reported


GI: reports: See HPI, Diarrhea, Nausea, Poor Appetite


: reports: No Symptoms Reported


Musculoskeletal: reports: No Symptoms Reported


Integumentary: reports: Dryness, Other (eczema- chronic)


Neuro: reports: Headache


Endocrine: reports: No Symptoms Reported


Hematology: reports: No Symptoms Reported


Psychiatric: reports: Orientated x3, Agitated, Anxious





Patient History





- Patient Medical History


Hx Anemia: No


Hx Asthma: No


Hx Chronic Obstructive Pulmonary Disease (COPD): No


Hx Cancer: No


Hx Cardiac Disorders: No


Hx Congestive Heart Failure: No


Hx Hypertension: No


Hx Hypercholesterolemia: No


Hx Pacemaker: No


HX Cerebrovascular Accident: No


Hx Seizures: No


Hx Dementia: No


Hx Diabetes: No


Hx Gastrointestinal Disorders: No


Hx Liver Disease: No


Hx Genitourinary Disorders: No


Hx Sexually Transmitted Disorders: No


Hx Renal Disease (ESRD): No


Hx Thyroid Disease: No


Hx Human Immunodeficiency Virus (HIV): No


Hx Hepatitis C: No


Hx Depression: No


Hx Suicide Attempt: No


Hx Bipolar Disorder: No


Hx Schizophrenia: No





- Patient Surgical History


Past Surgical History: No


Hx Neurologic Surgery: No


Hx Cataract Extraction: No


Hx Cardiac Surgery: No


Hx Lung Surgery: No


Hx Breast Surgery: No


Hx Breast Biopsy: No


Hx Abdominal Surgery: No


Hx Appendectomy: No


Hx Cholecystectomy: No


Hx Genitourinary Surgery: No


Hx  Section: No


Hx Orthopedic Surgery: No


Anesthesia Reaction: No





- PPD History


Date: 19


Results: 0mm





- Reproductive History


Last Menstrual Period: 10/15/19





- Smoking Cessation


Smoking history: Former smoker


Have you smoked in the past 12 months: Yes


Aproximately how many cigarettes per day: 20


If you are a former smoker, when did you quit?: 1 yr ago


Cigars Per Day: 0


Hx Chewing Tobacco Use: No


Initiated information on smoking cessation: Yes


'Breaking Loose' booklet given: 20





- Substances abused


  ** Heroin


Substance route: Inhalation


Frequency: Daily


Amount used: 70 bags


Age of first use: 20


Date of last use: 19





  ** Marijuana/Hashish


Substance route: Smoking


Frequency: Daily


Amount used: 2 bags


Age of first use: 20


Date of last use: 19





  ** PCP


Substance route: Smoking


Frequency: Daily


Amount used: 2 blunts


Age of first use: 22


Date of last use: 19





  ** Alcohol


Substance route: Oral


Frequency: 1-2 times per week


Amount used: 1 BOTTLE OF PATRON


Age of first use: 38


Date of last use: 10/28/19





Admission Physical Exam [facility]





- Vital Signs


Vital Signs: 


 Vital Signs - 24 hr











  19





  09:37 09:47


 


Temperature 97.4 F L 97.4 F L


 


Pulse Rate 87 87


 


Respiratory 20 20





Rate  


 


Blood Pressure 118/81 118/81














- Physical


General Appearance: Yes: Mild Distress, Irritable, Anxious


HEENTM: Yes: EOMI, Hearing grossly Normal, Normocephalic, Normal Voice


Respiratory: Yes: Chest Non-Tender, Lungs Clear, Normal Breath Sounds, No 

Respiratory Distress, No Accessory Muscle Use


Neck: Yes: No masses,lesions,Nodules, Trachea in good position


Cardiology: Yes: Regular Rhythm, Regular Rate, S1, S2, Other (EKG  19  QTc

  435 ms)


Abdominal: Yes: Non Tender, Soft


Back: Yes: Normal Inspection


Musculoskeletal: Yes: Gait Steady


Extremities: Yes: Normal Inspection, Normal Range of Motion, Non-Tender


Neurological: Yes: Fully Oriented, Alert, Motor Strength 5/5


Integumentary: Yes: Dry, Warm, Rash (chronic  eczema)





- Diagnostic


(1) Nicotine dependence


Current Visit: Yes   Status: Chronic   


Qualifiers: 


   Nicotine product type: cigarettes   Substance use status: uncomplicated   

Qualified Code(s): F17.210 - Nicotine dependence, cigarettes, uncomplicated   





(2) Opioid dependence with withdrawal


Current Visit: Yes   Status: Chronic   





Breathalyzer





- Breathalyzer


Breathalyzer: 0





Urine Drug Screen





- Test Device


Lot number: [id number]


Expiration date: 21





- Control


Is test valid?: Yes





- Results


Drug screen NEGATIVE: No


Urine drug screen results: MOP-Opiates





Inpatient Rehab Admission





- Rehab Decision to Admit


Inpatient rehab admission?: No

## 2019-12-14 RX ADMIN — Medication SCH MG: at 22:26

## 2019-12-14 RX ADMIN — Medication SCH APPLIC: at 10:23

## 2019-12-14 RX ADMIN — Medication SCH APPLIC: at 22:26

## 2019-12-14 RX ADMIN — Medication SCH TAB: at 10:23

## 2019-12-15 VITALS — TEMPERATURE: 98.1 F | HEART RATE: 65 BPM | SYSTOLIC BLOOD PRESSURE: 135 MMHG | DIASTOLIC BLOOD PRESSURE: 69 MMHG

## 2019-12-15 RX ADMIN — Medication SCH TAB: at 10:33

## 2019-12-15 RX ADMIN — Medication SCH APPLIC: at 10:33

## 2019-12-15 NOTE — DS
[facility] Detox Discharge Summary


Admission Date: 


12/13/19





Discharge Date: 12/15/19





- History


Present History: Cannabis Dependence, Opioid Dependence, Pcp Dependence


Additional Comments: 





Patient demanded to leave AMA despite encouragement from staff to complete 

detox. Patient's female peer (RW) reported that patient gave her and a male 

peer (DJ) valium from her room then RW stated it wasn't valium but it was 

seroquel in which patient and DJ denies such allegation. Patient's room was 

searched by security staff but no contraband found. Patient initially had 

verbal altercation with female peer RW. Patient gave u-tox which was positive 

for methadone and opiate. Patient instructed to call 911 if feeling sick or 

withdrawal sxs and to see her PCP within 3 days.


Pertinent Past History: 





Opioid dependence





PCP dependence





Nicotine dependence





THC dependence





- Physical Exam Results


Vital Signs: 


 Vital Signs











Temperature  98.1 F   12/15/19 06:00


 


Pulse Rate  65   12/15/19 06:00


 


Respiratory Rate  18   12/15/19 06:00


 


Blood Pressure  135/69   12/15/19 06:00


 


O2 Sat by Pulse Oximetry (%)      











Pertinent Admission Physical Exam Findings: 





Withdrawal sxs





 Laboratory Tests











  12/13/19 12/13/19 12/14/19





  10:00 10:00 05:50


 


WBC  4.9  


 


RBC  3.95  


 


Hgb  10.6 L  


 


Hct  32.2 L  


 


MCV  81.5  


 


MCH  26.8  


 


MCHC  32.9  


 


RDW  17.1 H  


 


Plt Count  285  


 


MPV  6.9 L  


 


Sodium   134 L 


 


Potassium   4.2 


 


Chloride   103 


 


Carbon Dioxide   27 


 


Anion Gap   4 L 


 


BUN   14.1 


 


Creatinine   0.7 


 


Est GFR (CKD-EPI)AfAm   127.39 


 


Est GFR (CKD-EPI)NonAf   109.91 


 


Random Glucose   83 


 


Calcium   9.0 


 


Total Bilirubin   0.2 


 


AST   16 


 


ALT   36 


 


Alkaline Phosphatase   82 


 


Total Protein   7.2 


 


Albumin   3.6 


 


HIV 1&2 Antibody Screen    Negative


 


HIV P24 Antigen    Negative








Labs reviewed: anemia, hyponatremia





- Medication


Discharge Medications: 


Ambulatory Orders





NK [No Known Home Medication]  07/01/19 











- Diagnosis


(1) Anemia


Status: Chronic   





(2) Hyponatremia


Status: Acute   





(3) Marijuana dependence


Status: Chronic   





(4) Nicotine dependence


Status: Chronic   


Qualifiers: 


   Nicotine product type: cigarettes   Substance use status: uncomplicated   

Qualified Code(s): F17.210 - Nicotine dependence, cigarettes, uncomplicated   





(5) Opioid dependence with withdrawal


Status: Acute   





(6) PCP dependence


Status: Chronic   





- AMA


Did Patient Leave Against Medical Advice: Yes (Instructed to call 911 ASAP if 

sick or withdrawal sxs)

## 2020-01-26 ENCOUNTER — HOSPITAL ENCOUNTER (EMERGENCY)
Dept: HOSPITAL 74 - JER | Age: 40
Discharge: HOME | End: 2020-01-26
Payer: COMMERCIAL

## 2020-01-26 VITALS — HEART RATE: 96 BPM | TEMPERATURE: 98.5 F | SYSTOLIC BLOOD PRESSURE: 160 MMHG | DIASTOLIC BLOOD PRESSURE: 92 MMHG

## 2020-01-26 VITALS — BODY MASS INDEX: 22.6 KG/M2

## 2020-01-26 DIAGNOSIS — F17.210: ICD-10-CM

## 2020-01-26 DIAGNOSIS — F11.10: Primary | ICD-10-CM

## 2020-01-26 DIAGNOSIS — F16.20: ICD-10-CM

## 2020-01-26 NOTE — PDOC
Attending Attestation





- Resident


Resident Name: ZeniaTimur





- ED Attending Attestation


I have performed the following: I have examined & evaluated the patient, The 

case was reviewed & discussed with the resident, I agree w/resident's findings 

& plan





- HPI


HPI: 





01/26/20 21:08


Pt comes with heroin and LSD use.  Pt's friend called EMS because she was 

sleepy and she required narcan. EMS brought her in.  Pt is awake alert appears 

well and wants to fo home.


Pt states that she feels fine.  She has no job. She has 7 kids and her mom 

raises the kids.





- Physicial Exam


PE: 





01/26/20 21:09


Heart lungs normal


HEENT normal -poor dentition with caries and brokern missing teeth


Neuro intact


Abd/flank no pain








- Medical Decision Making





01/26/20 21:10


Pt is ready to go [facility].  There is a bed at detox for females, however pt is 

refusing to go.


Pt will be discharged home with her friend.

## 2020-01-26 NOTE — PDOC
History of Present Illness





- General


Chief Complaint: Substance Abuse


Stated Complaint: POSSIBLE OVERDOSE


Time Seen by Provider: 01/26/20 20:27


History Source: Patient


Exam Limitations: No Limitations





- History of Present Illness


Initial Comments: 





40 yo F with a hx of ETOH abuse, heroin abuse, PCP abuse, and COPD presents to 

the emergency department s/p suspected overdose per EMS. Per EMS, a call was 

placed for an apparent overdose. EMS crew found the patient to be excessively 

sleepy and was given a total of 4 mg of narcan. Per the patient, she snorted 

her usual PCP and denies acquiring her drugs from a new dealer. Currently, the 

patient feels well and states she wants to go home. Denies the following: fevers

, chills, SOB, chest pain, nausea, vomiting, visual disturbance, abdominal pain

, lightheadedness, headache, and leg pain/swelling. 





Allergies: NKDA











Past History





- Past Medical History


Allergies/Adverse Reactions: 


 Allergies











Allergy/AdvReac Type Severity Reaction Status Date / Time


 


No Known Allergies Allergy   Verified 01/26/20 19:43











Home Medications: 


Ambulatory Orders





NK [No Known Home Medication]  07/01/19 








Anemia: No


Asthma: No


Cancer: No


Cardiac Disorders: No


CVA: No


COPD: No


CHF: No


Dementia: No


Diabetes: No


GI Disorders: No


 Disorders: No


HTN: No


Hypercholesterolemia: No


Kidney Stones: No


Liver Disease: No


Seizures: No


Thyroid Disease: No





- Surgical History


Abdominal Surgery: No


Appendectomy: No


Cardiac Surgery: No


Cholecystectomy: No


Lung Surgery: No


Neurologic Surgery: No


Orthopedic Surgery: No





- Reproductive History


PID: No





- Psycho Social/Smoking Cessation Hx


Smoking History: Current every day smoker


Have you smoked in the past 12 months: Yes


Number of Cigarettes Smoked Daily: 20


If you are a former smoker, when did you quit?: 1 yr ago


Cigars Per Day: 0


Information on smoking cessation initiated: Yes


'Breaking Loose' booklet given: 12/13/19


Hx Alcohol Use: No


Drug/Substance Use Hx: Yes (heroin)


Substance Use Type: Heroin, Marijuana


Hx Substance Use Treatment: Yes





**Review of Systems





- Review of Systems


Able to Perform ROS?: Yes


Is the patient limited English proficient: No


Constitutional: No: Chills, Diaphoresis, Fever, Weakness


HEENTM: No: Eye Pain, Ear Pain, Nose Pain, Throat Pain, Mouth Pain


Respiratory: No: Cough, Shortness of Breath, Hemoptysis


Cardiac (ROS): No: Chest Pain, Lightheadedness, Palpitations, Chest Tightness


ABD/GI: No: Constipated, Diarrhea, Nausea, Rectal Bleeding, Vomiting, Tarry 

Stools


: No: Burning, Dysuria, Hematuria


Musculoskeletal: No: Back Pain, Joint Pain, Neck Pain


Integumentary: No: Bruising, Erythema, Rash


Neurological: No: Headache, Numbness, Tingling


Psychiatric: No: Change in Appetite


Endocrine: No: Unexplained Weight Loss


Hematologic/Lymphatic: No: Anemia





*Physical Exam





- Vital Signs


 Last Vital Signs











Temp Pulse Resp BP Pulse Ox


 


 98.5 F   96 H  18   160/92   100 


 


 01/26/20 19:44  01/26/20 19:44  01/26/20 19:44  01/26/20 19:44  01/26/20 19:44














- Physical Exam


General Appearance: Yes: Nourished, Appropriately Dressed.  No: Apparent 

Distress, Intoxicated


HEENT: positive: EOMI, NAOMI, Normal Voice, Pharynx Normal, Hearing Grossly 

Normal, Other (poor dentition).  negative: Pale Conjunctivae, Scleral Icterus (R

), Scleral Icterus (L), Muffled/Hoarse voice, Pharyngeal Erythema, Tonsillar 

Exudate, Tonsillar Erythema, Nasal Congestion, Rhinorrhea, Sinus Tenderness, 

Excessive drooling


Neck: positive: Trachea midline, Supple.  negative: Tender, Lymphadenopathy (R)

, Lymphadenopathy (L), Tender lateral, Tender midline


Respiratory/Chest: positive: Lungs Clear, Normal Breath Sounds.  negative: 

Chest Tender, Respiratory Distress, Accessory Muscle Use, Crackles, Rales, 

Rhonchi, Stridor, Wheezing


Cardiovascular: positive: Regular Rhythm, Regular Rate, S1, S2.  negative: 

Systolic Murmur


Gastrointestinal/Abdominal: positive: Normal Bowel Sounds, Flat, Soft.  negative

: Tender, Distended, Guarding, Rebound


Lymphatic: negative: Adenopathy


Musculoskeletal: positive: Normal Inspection.  negative: CVA Tenderness, 

Vertebral Tenderness


Extremity: positive: Normal Capillary Refill, Normal Inspection, Normal Range 

of Motion.  negative: Tender, Swelling, Calf Tenderness


Integumentary: positive: Normal Color, Dry, Warm.  negative: Swelling, 

Ecchymosis


Neurologic: positive: CNs II-XII NML intact, Fully Oriented, Alert, Normal Mood/

Affect, Motor Strength 5/5





Medical Decision Making





- Medical Decision Making








40 yo F with a hx of ETOH abuse, heroin abuse, PCP abuse, and COPD presents to 

the emergency department s/p suspected overdose per EMS. Per EMS, a call was 

placed for an apparent overdose.





Initial vitals:


 Initial Vital Signs











Temp Pulse Resp BP Pulse Ox


 


 98.2 F   97 H  17   163/98   100 


 


 01/26/20 19:34  01/26/20 19:34  01/26/20 19:34  01/26/20 19:34  01/26/20 19:34








Work up:


Patient is refusing to stay in the emergency department. clinically she is well 

appearing. A bed at [facility] for females was offered to her because of its 

availability for detox and the patient refused. The patient was able to 

ambulate on her own volition with her friend out of the emergency department. 











Discharge





- Discharge Information


Problems reviewed: Yes


Clinical Impression/Diagnosis: 


 Heroin abuse, PCP dependence





Condition: Stable


Disposition: HOME





- Admission


No





- Follow up/Referral


Referrals: 


[facility] [place] [Provider Group]





- Patient Discharge Instructions


Patient Printed Discharge Instructions:  [facility]


Additional Instructions: 


You were seen in the emergency department for the evaluation of your overdose. 

Please return to the emergency department if you have worsening symptoms or new 

concerning symptoms. Please follow up with your primary medical doctor within 1 

week after discharge. Please abstain from drugs. 





- Post Discharge Activity

## 2020-08-03 ENCOUNTER — HOSPITAL ENCOUNTER (EMERGENCY)
Dept: HOSPITAL 74 - JER | Age: 40
Discharge: NURSING, REHAB OR LONG-TERM CARE | End: 2020-08-03
Payer: COMMERCIAL

## 2020-08-03 VITALS — DIASTOLIC BLOOD PRESSURE: 88 MMHG | TEMPERATURE: 99.8 F | SYSTOLIC BLOOD PRESSURE: 139 MMHG | HEART RATE: 94 BPM

## 2020-08-03 VITALS — BODY MASS INDEX: 21.6 KG/M2

## 2020-08-03 DIAGNOSIS — F19.10: Primary | ICD-10-CM

## 2020-08-03 LAB
ALBUMIN SERPL-MCNC: 4 G/DL (ref 3.4–5)
ALP SERPL-CCNC: 71 U/L (ref 45–117)
ALT SERPL-CCNC: 25 U/L (ref 13–61)
AMPHET UR-MCNC: NEGATIVE NG/ML
ANION GAP SERPL CALC-SCNC: 6 MMOL/L (ref 8–16)
APPEARANCE UR: CLEAR
AST SERPL-CCNC: 21 U/L (ref 15–37)
BACTERIA # UR AUTO: 1024 /UL (ref 0–1359)
BARBITURATES UR-MCNC: NEGATIVE NG/ML
BASOPHILS # BLD: 0.4 % (ref 0–2)
BENZODIAZ UR SCN-MCNC: NEGATIVE NG/ML
BILIRUB SERPL-MCNC: 0.3 MG/DL (ref 0.2–1)
BILIRUB UR STRIP.AUTO-MCNC: NEGATIVE MG/DL
BUN SERPL-MCNC: 13.2 MG/DL (ref 7–18)
CALCIUM SERPL-MCNC: 8.9 MG/DL (ref 8.5–10.1)
CASTS URNS QL MICRO: 1 /UL (ref 0–3.1)
CHLORIDE SERPL-SCNC: 108 MMOL/L (ref 98–107)
CO2 SERPL-SCNC: 26 MMOL/L (ref 21–32)
COCAINE UR-MCNC: NEGATIVE NG/ML
COLOR UR: YELLOW
CREAT SERPL-MCNC: 0.8 MG/DL (ref 0.55–1.3)
DEPRECATED RDW RBC AUTO: 18 % (ref 11.6–15.6)
EOSINOPHIL # BLD: 3.5 % (ref 0–4.5)
EPITH CASTS URNS QL MICRO: >36 /UL (ref 0–25.1)
GLUCOSE SERPL-MCNC: 100 MG/DL (ref 74–106)
HCT VFR BLD CALC: 30 % (ref 32.4–45.2)
HGB BLD-MCNC: 9.6 GM/DL (ref 10.7–15.3)
INR BLD: 0.97 (ref 0.83–1.09)
KETONES UR QL STRIP: NEGATIVE
LEUKOCYTE ESTERASE UR QL STRIP.AUTO: (no result)
LYMPHOCYTES # BLD: 30 % (ref 8–40)
MCH RBC QN AUTO: 24.3 PG (ref 25.7–33.7)
MCHC RBC AUTO-ENTMCNC: 32 G/DL (ref 32–36)
MCV RBC: 75.9 FL (ref 80–96)
METHADONE UR-MCNC: NEGATIVE NG/ML
MONOCYTES # BLD AUTO: 8.8 % (ref 3.8–10.2)
NEUTROPHILS # BLD: 57.3 % (ref 42.8–82.8)
NITRITE UR QL STRIP: NEGATIVE
OPIATES UR QL SCN: POSITIVE NG/ML
PCP UR QL SCN: POSITIVE NG/ML
PH UR: 6 [PH] (ref 5–8)
PLATELET # BLD AUTO: 192 K/MM3 (ref 134–434)
PMV BLD: 6.7 FL (ref 7.5–11.1)
POTASSIUM SERPLBLD-SCNC: 4.2 MMOL/L (ref 3.5–5.1)
PROT SERPL-MCNC: 7.6 G/DL (ref 6.4–8.2)
PROT UR QL STRIP: NEGATIVE
PROT UR QL STRIP: NEGATIVE
PT PNL PPP: 11.5 SEC (ref 9.7–13)
RBC # BLD AUTO: 3.96 M/MM3 (ref 3.6–5.2)
RBC # BLD AUTO: 4 /UL (ref 0–23.9)
SODIUM SERPL-SCNC: 140 MMOL/L (ref 136–145)
SP GR UR: 1.02 (ref 1.01–1.03)
UROBILINOGEN UR STRIP-MCNC: 0.2 MG/DL (ref 0.2–1)
WBC # BLD AUTO: 4.7 K/MM3 (ref 4–10)
WBC # UR AUTO: 47 /UL (ref 0–25.8)

## 2020-08-03 NOTE — PDOC
Rapid Medical Evaluation


Time Seen by Provider: 08/03/20 20:05


Medical Evaluation: 


                                    Allergies











Allergy/AdvReac Type Severity Reaction Status Date / Time


 


No Known Allergies Allergy   Verified 01/26/20 19:43











08/03/20 20:06


39 year old female BIBA for hypertension. as per EMS " call for unknown 

cardiac.." patient reports that she was trying to get admitted for detox for 

PCP, crack, heroin


                                Last Vital Signs











Temp Pulse Resp BP Pulse Ox


 


 99.8 F H  94 H  20   139/88   99 


 


 08/03/20 20:06  08/03/20 20:06  08/03/20 20:06  08/03/20 20:06  08/03/20 20:06











Pe: patient alert awake. sleepy. 





A: substance abuse








P: patient to the ER for further management of care





**Discharge Disposition





- Diagnosis


 Substance abuse








- Referrals





- Patient Instructions





- Post Discharge Activity

## 2020-08-03 NOTE — PDOC
History of Present Illness





- General


Chief Complaint: Substance Abuse


Stated Complaint: SUBSTANCE ABUSE


Time Seen by Provider: 08/03/20 20:05





- History of Present Illness


Initial Comments: 





08/03/20 21:43


39-year-old female sent here from [facility] for admission blood work for 

detox from polysubstance abuse she denies taking anything prior to arrival.





Past History





- Medical History


Allergies/Adverse Reactions: 


                                    Allergies











Allergy/AdvReac Type Severity Reaction Status Date / Time


 


No Known Allergies Allergy   Verified 01/26/20 19:43











Home Medications: 


Ambulatory Orders





NK [No Known Home Medication]  07/01/19 








Anemia: No


Asthma: No


Cancer: No


Cardiac Disorders: No


CVA: No


COPD: No


CHF: No


Dementia: No


Diabetes: No


GI Disorders: No


 Disorders: No


HTN: No


Hypercholesterolemia: No


Kidney Stones: No


Liver Disease: No


Seizures: No


Thyroid Disease: No





- Surgical History


Abdominal Surgery: No


Appendectomy: No


Cardiac Surgery: No


Cholecystectomy: No


Lung Surgery: No


Neurologic Surgery: No


Orthopedic Surgery: No





- Reproductive History


PID: No





- Psycho-Social/Smoking History


Smoking History: Current every day smoker


Have you smoked in the past 12 months: Yes


Number of Cigarettes Smoked Daily: 20


If you are a former smoker, when did you quit?: 1 yr ago


Cigars Per Day: 0


Information on smoking cessation initiated: No


'Breaking Loose' booklet given: 12/13/19





- Substance Abuse Hx (Audit-C & DAST Scrn)


How often the patient has a drink containing alcohol: Never


Score: In Men: 4 or > Positive; In Women: 3 or > Positive: 0


Screen Result (Pos requires Nsg. Audit-10AR): Negative





**Review of Systems





- Review of Systems


ABD/GI: No: Nausea, Vomiting





*Physical Exam





- Vital Signs


                                Last Vital Signs











Temp Pulse Resp BP Pulse Ox


 


 99.8 F H  94 H  20   139/88   99 


 


 08/03/20 20:06  08/03/20 20:06  08/03/20 20:06  08/03/20 20:06  08/03/20 20:06














- Physical Exam


General Appearance: Yes: Nourished, Appropriately Dressed.  No: Apparent 

Distress


HEENT: positive: Symmetrical


Neck: positive: Supple


Respiratory/Chest: positive: Normal Breath Sounds.  negative: Respiratory 

Distress


Musculoskeletal: positive: Normal Inspection


Extremity: positive: Normal Inspection


Integumentary: positive: Normal Color


Neurologic: positive: Fully Oriented, Alert, Normal Mood/Affect





ED Treatment Course





- LABORATORY


CBC & Chemistry Diagram: 


                                 08/03/20 21:11





                                 08/03/20 21:11





- ADDITIONAL ORDERS


Additional order review: 


                               Laboratory  Results











  08/03/20





  21:11


 


PT with INR  11.50


 


INR  0.97








                                        











  08/03/20





  21:11


 


RBC  3.96


 


MCV  75.9 L


 


MCHC  32.0


 


RDW  18.0 H


 


MPV  6.7 L


 


Neutrophils %  57.3  D


 


Lymphocytes %  30.0  D


 


Monocytes %  8.8


 


Eosinophils %  3.5


 


Basophils %  0.4














Medical Decision Making





- Medical Decision Making





08/03/20 22:19


Female beds available at [facility] Patient except





Discharge





- Discharge Information


Problems reviewed: Yes


Clinical Impression/Diagnosis: 


 Substance abuse





Condition: Stable


Disposition: SKILLED NURSING FACILITY





- Admission


No





- Follow up/Referral





- Patient Discharge Instructions





- Post Discharge Activity

## 2020-08-04 NOTE — EKG
Test Reason : 

Blood Pressure : ***/*** mmHG

Vent. Rate : 087 BPM     Atrial Rate : 087 BPM

   P-R Int : 174 ms          QRS Dur : 098 ms

    QT Int : 370 ms       P-R-T Axes : 045 042 036 degrees

   QTc Int : 445 ms

 

NORMAL SINUS RHYTHM

POSSIBLE LEFT ATRIAL ENLARGEMENT

LEFT VENTRICULAR HYPERTROPHY

NONSPECIFIC T WAVE ABNORMALITY

ABNORMAL ECG

NO PREVIOUS ECGS AVAILABLE

Confirmed by Ryan Akers ([id number]) on 8/4/2020 10:43:52 AM

 

Referred By:             Confirmed By:Ryan Akers

## 2023-03-11 ENCOUNTER — HOSPITAL ENCOUNTER (EMERGENCY)
Dept: HOSPITAL 74 - JER | Age: 43
Discharge: HOME | End: 2023-03-11
Payer: COMMERCIAL

## 2023-03-11 VITALS — DIASTOLIC BLOOD PRESSURE: 103 MMHG | SYSTOLIC BLOOD PRESSURE: 184 MMHG | RESPIRATION RATE: 20 BRPM | HEART RATE: 95 BPM

## 2023-03-11 VITALS — BODY MASS INDEX: 32.2 KG/M2

## 2023-03-11 VITALS — TEMPERATURE: 98.2 F
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
## 2023-03-11 DIAGNOSIS — S42.202D: Primary | ICD-10-CM

## 2023-03-11 PROCEDURE — 3E0233Z INTRODUCTION OF ANTI-INFLAMMATORY INTO MUSCLE, PERCUTANEOUS APPROACH: ICD-10-PCS
